# Patient Record
Sex: MALE | Race: WHITE | NOT HISPANIC OR LATINO | Employment: OTHER | URBAN - METROPOLITAN AREA
[De-identification: names, ages, dates, MRNs, and addresses within clinical notes are randomized per-mention and may not be internally consistent; named-entity substitution may affect disease eponyms.]

---

## 2017-01-04 ENCOUNTER — ANESTHESIA EVENT (OUTPATIENT)
Dept: PERIOP | Facility: HOSPITAL | Age: 57
End: 2017-01-04

## 2017-01-04 ENCOUNTER — ANESTHESIA (OUTPATIENT)
Dept: PERIOP | Facility: HOSPITAL | Age: 57
End: 2017-01-04

## 2017-01-04 PROCEDURE — 25010000002 PROPOFOL 10 MG/ML EMULSION: Performed by: NURSE ANESTHETIST, CERTIFIED REGISTERED

## 2017-01-04 PROCEDURE — 25010000002 KETOROLAC TROMETHAMINE PER 15 MG: Performed by: NURSE ANESTHETIST, CERTIFIED REGISTERED

## 2017-01-04 PROCEDURE — 25010000003 CEFAZOLIN IN DEXTROSE 2-4 GM/100ML-% SOLUTION: Performed by: ORTHOPAEDIC SURGERY

## 2017-01-04 PROCEDURE — 25010000002 PHENYLEPHRINE PER 1 ML: Performed by: NURSE ANESTHETIST, CERTIFIED REGISTERED

## 2017-01-04 PROCEDURE — 25010000002 ONDANSETRON PER 1 MG: Performed by: NURSE ANESTHETIST, CERTIFIED REGISTERED

## 2017-01-04 PROCEDURE — 25010000002 FENTANYL CITRATE (PF) 100 MCG/2ML SOLUTION: Performed by: NURSE ANESTHETIST, CERTIFIED REGISTERED

## 2017-01-04 RX ORDER — SODIUM CHLORIDE, SODIUM LACTATE, POTASSIUM CHLORIDE, CALCIUM CHLORIDE 600; 310; 30; 20 MG/100ML; MG/100ML; MG/100ML; MG/100ML
INJECTION, SOLUTION INTRAVENOUS CONTINUOUS PRN
Status: DISCONTINUED | OUTPATIENT
Start: 2017-01-04 | End: 2017-01-04 | Stop reason: SURG

## 2017-01-04 RX ORDER — PROPOFOL 10 MG/ML
VIAL (ML) INTRAVENOUS AS NEEDED
Status: DISCONTINUED | OUTPATIENT
Start: 2017-01-04 | End: 2017-01-04 | Stop reason: SURG

## 2017-01-04 RX ORDER — FENTANYL CITRATE 50 UG/ML
INJECTION, SOLUTION INTRAMUSCULAR; INTRAVENOUS AS NEEDED
Status: DISCONTINUED | OUTPATIENT
Start: 2017-01-04 | End: 2017-01-04 | Stop reason: SURG

## 2017-01-04 RX ORDER — ONDANSETRON 2 MG/ML
INJECTION INTRAMUSCULAR; INTRAVENOUS AS NEEDED
Status: DISCONTINUED | OUTPATIENT
Start: 2017-01-04 | End: 2017-01-04 | Stop reason: SURG

## 2017-01-04 RX ORDER — LIDOCAINE HYDROCHLORIDE 20 MG/ML
INJECTION, SOLUTION INFILTRATION; PERINEURAL AS NEEDED
Status: DISCONTINUED | OUTPATIENT
Start: 2017-01-04 | End: 2017-01-04 | Stop reason: SURG

## 2017-01-04 RX ORDER — KETOROLAC TROMETHAMINE 30 MG/ML
INJECTION, SOLUTION INTRAMUSCULAR; INTRAVENOUS AS NEEDED
Status: DISCONTINUED | OUTPATIENT
Start: 2017-01-04 | End: 2017-01-04 | Stop reason: SURG

## 2017-01-04 RX ADMIN — FENTANYL CITRATE 50 MCG: 50 INJECTION INTRAMUSCULAR; INTRAVENOUS at 14:34

## 2017-01-04 RX ADMIN — CEFAZOLIN SODIUM 2 G: 2 INJECTION, SOLUTION INTRAVENOUS at 14:15

## 2017-01-04 RX ADMIN — KETOROLAC TROMETHAMINE 30 MG: 30 INJECTION, SOLUTION INTRAMUSCULAR; INTRAVENOUS at 14:40

## 2017-01-04 RX ADMIN — LIDOCAINE HYDROCHLORIDE 100 MG: 20 INJECTION, SOLUTION INFILTRATION; PERINEURAL at 14:16

## 2017-01-04 RX ADMIN — PHENYLEPHRINE HYDROCHLORIDE 100 MCG: 10 INJECTION INTRAVENOUS at 14:23

## 2017-01-04 RX ADMIN — FENTANYL CITRATE 50 MCG: 50 INJECTION INTRAMUSCULAR; INTRAVENOUS at 14:16

## 2017-01-04 RX ADMIN — EPHEDRINE SULFATE 10 MG: 50 INJECTION INTRAMUSCULAR; INTRAVENOUS; SUBCUTANEOUS at 14:50

## 2017-01-04 RX ADMIN — PHENYLEPHRINE HYDROCHLORIDE 50 MCG: 10 INJECTION INTRAVENOUS at 14:47

## 2017-01-04 RX ADMIN — ONDANSETRON 4 MG: 2 INJECTION INTRAMUSCULAR; INTRAVENOUS at 14:42

## 2017-01-04 RX ADMIN — PROPOFOL 100 MG: 10 INJECTION, EMULSION INTRAVENOUS at 14:18

## 2017-01-04 RX ADMIN — PROPOFOL 200 MG: 10 INJECTION, EMULSION INTRAVENOUS at 14:16

## 2017-01-04 RX ADMIN — SODIUM CHLORIDE, POTASSIUM CHLORIDE, SODIUM LACTATE AND CALCIUM CHLORIDE: 600; 310; 30; 20 INJECTION, SOLUTION INTRAVENOUS at 14:13

## 2017-01-04 NOTE — ANESTHESIA PROCEDURE NOTES
Airway  Urgency: elective    Date/Time: 1/4/2017 2:19 PM  Airway not difficult    General Information and Staff    Patient location during procedure: OR  Anesthesiologist: RENDER, AMANDA RAY  CRNA: YONATHAN ARCHULETA    Indications and Patient Condition  Indications for airway management: airway protection    Preoxygenated: yes  MILS maintained throughout  Mask difficulty assessment: 1 - vent by mask    Final Airway Details  Final airway type: supraglottic airway      Successful airway: classic  Size 5    Number of attempts at approach: 1    Additional Comments  Patient in OR. Monitors on. BLVS. Pre 02 100%. SIVI. LMA placed with ease. No leak present. BBS and ETCO2 present. Secured. Teeth/lips as preop

## 2017-01-04 NOTE — ANESTHESIA POSTPROCEDURE EVALUATION
Patient: Silas Kwon    Procedure Summary     Date Anesthesia Start Anesthesia Stop Room / Location    01/04/17 1413 1502  [facility] /  [facility]       Procedure Diagnosis Surgeon Provider    LT KNEE ARTHROSCOPY WITH PARTIAL MEDIAL AND LATERAL MENISECTOMY, CHONDROPLASTY (Left Knee) No diagnosis on file. MD Hector Bernstein MD          Anesthesia Type: general  Last vitals  /79 (01/04/17 1530)    Temp 36.6 °C (97.8 °F) (01/04/17 1501)    Pulse 87 (01/04/17 1530)   Resp 16 (01/04/17 1530)    SpO2 97 % (01/04/17 1530)      Post Anesthesia Care and Evaluation    Patient location during evaluation: bedside  Patient participation: complete - patient participated  Level of consciousness: awake and alert  Pain management: adequate  Airway patency: patent  Anesthetic complications: no  Cardiovascular status: acceptable  Respiratory status: acceptable  Hydration status: acceptable

## 2017-01-04 NOTE — ANESTHESIA PREPROCEDURE EVALUATION
Anesthesia Evaluation      Airway   Mallampati: II  TM distance: >3 FB  Neck ROM: full  no difficulty expected  Dental - normal exam     Pulmonary - negative pulmonary ROS and normal exam   (-) wheezes  Cardiovascular - negative cardio ROS  (-) murmur    Rhythm: regular    Neuro/Psych- negative ROS  GI/Hepatic/Renal/Endo - negative ROS     Musculoskeletal (-) negative ROS    Abdominal    Substance History - negative use     OB/GYN          Other                           Anesthesia Plan    ASA 2     general   (  D/W R&B of GA including but not limited to: heart, lung, liver, kidney, neurologic problems, positioning injuries, dental damage, corneal abrasion and TMJ.  .)  intravenous induction

## 2019-07-29 ENCOUNTER — OFFICE VISIT (OUTPATIENT)
Dept: FAMILY MEDICINE CLINIC | Facility: CLINIC | Age: 59
End: 2019-07-29

## 2019-07-29 VITALS
HEIGHT: 70 IN | TEMPERATURE: 98.5 F | HEART RATE: 81 BPM | OXYGEN SATURATION: 96 % | WEIGHT: 175.2 LBS | BODY MASS INDEX: 25.08 KG/M2 | DIASTOLIC BLOOD PRESSURE: 72 MMHG | SYSTOLIC BLOOD PRESSURE: 100 MMHG

## 2019-07-29 DIAGNOSIS — M25.511 CHRONIC RIGHT SHOULDER PAIN: ICD-10-CM

## 2019-07-29 DIAGNOSIS — G89.29 CHRONIC RIGHT SHOULDER PAIN: ICD-10-CM

## 2019-07-29 DIAGNOSIS — Z12.5 PROSTATE CANCER SCREENING: ICD-10-CM

## 2019-07-29 DIAGNOSIS — E78.2 MIXED HYPERLIPIDEMIA: ICD-10-CM

## 2019-07-29 DIAGNOSIS — Z00.00 ROUTINE HEALTH MAINTENANCE: Primary | ICD-10-CM

## 2019-07-29 DIAGNOSIS — E55.9 AVITAMINOSIS D: ICD-10-CM

## 2019-07-29 PROBLEM — J30.9 ALLERGIC RHINITIS: Status: ACTIVE | Noted: 2019-07-29

## 2019-07-29 PROBLEM — M25.562 KNEE PAIN, LEFT: Status: RESOLVED | Noted: 2019-07-29 | Resolved: 2019-07-29

## 2019-07-29 PROCEDURE — 99396 PREV VISIT EST AGE 40-64: CPT | Performed by: INTERNAL MEDICINE

## 2019-07-29 NOTE — PROGRESS NOTES
Subjective   Silas Kwon is a 58 y.o. male.     Chief Complaint   Patient presents with   • Annual Exam     establish care   • Labs Only       History of Present Illness   Patient is establishing new doctor.  Has not been seen since 11/2016.  In past was told low vitamin D and high cholesterol.  Tries to follow a healthy diet.  Has a chronic right shoulder pain that is restricting his motion slightly.  Has been present for years.  Using Relief Factor and glucosamine chondroiton and helps some but is concerned.    The following portions of the patient's history were reviewed and updated as appropriate: allergies, current medications, past family history, past medical history, past social history, past surgical history and problem list.    Past Medical History:   Diagnosis Date   • Deviated nasal septum    • Hyperlipidemia    • Knee pain, left        Past Surgical History:   Procedure Laterality Date   • AMPUTATION FINGER / THUMB Left 08/2015    Medicare amputation of digits 4 and half of digit 5 following a power saw accident occurred   • KNEE ARTHROSCOPY Left 1/4/2017    Procedure: LT KNEE ARTHROSCOPY WITH PARTIAL MEDIAL AND LATERAL MENISECTOMY, CHONDROPLASTY;  Surgeon: Abiel Watson MD;  Location: [facility] OR [facility] [place];  Service:    • KNEE SURGERY     • VASECTOMY     • WISDOM TOOTH EXTRACTION         Family History   Problem Relation Age of Onset   • Colon cancer Brother        Social History     Socioeconomic History   • Marital status: Unknown     Spouse name: Not on file   • Number of children: Not on file   • Years of education: Not on file   • Highest education level: Not on file   Tobacco Use   • Smoking status: Never Smoker   • Smokeless tobacco: Never Used   Substance and Sexual Activity   • Alcohol use: No     Comment: Occasional   • Drug use: No       Review of Systems   Constitutional: Negative for activity change, appetite change, fatigue, fever, unexpected weight gain and unexpected weight loss.    HENT: Positive for congestion and postnasal drip. Negative for tinnitus and voice change.    Eyes: Negative for visual disturbance.   Respiratory: Negative for apnea, cough, chest tightness and shortness of breath.    Cardiovascular: Negative for chest pain, palpitations and leg swelling.   Gastrointestinal: Negative for abdominal distention, abdominal pain, constipation, diarrhea, nausea, vomiting, GERD and indigestion.   Genitourinary: Negative for urinary incontinence, frequency and erectile dysfunction.   Musculoskeletal: Positive for arthralgias. Negative for back pain, gait problem, joint swelling and bursitis.   Neurological: Negative for dizziness, tremors, syncope, weakness, light-headedness, headache and memory problem.   Hematological: Does not bruise/bleed easily.   Psychiatric/Behavioral: Negative for sleep disturbance and depressed mood. The patient is not nervous/anxious.        Objective   Vitals:    07/29/19 1225   BP: 100/72   Pulse: 81   Temp: 98.5 °F (36.9 °C)   SpO2: 96%     Body mass index is 25.14 kg/m².  Physical Exam   Constitutional: He is oriented to person, place, and time. He appears well-developed and well-nourished. No distress.   HENT:   Head: Normocephalic and atraumatic.   Right Ear: Hearing, tympanic membrane, external ear and ear canal normal.   Left Ear: Hearing, tympanic membrane, external ear and ear canal normal.   Nose: Nose normal.   Mouth/Throat: Oropharynx is clear and moist.   Eyes: Conjunctivae, EOM and lids are normal. Pupils are equal, round, and reactive to light. Right eye exhibits no discharge.   Neck: Normal range of motion. Neck supple. No JVD present. No tracheal deviation present. No thyromegaly present.   Cardiovascular: Normal rate, regular rhythm, normal heart sounds and intact distal pulses.   No murmur heard.  Pulmonary/Chest: Effort normal and breath sounds normal. No respiratory distress. He has no wheezes. He has no rales.   Abdominal: Soft. Normal  appearance, normal aorta and bowel sounds are normal. He exhibits no ascites, no pulsatile midline mass and no mass. There is no tenderness.   Lymphadenopathy:     He has no cervical adenopathy.   Neurological: He is alert and oriented to person, place, and time. He has normal reflexes.   Skin: Skin is intact. No rash noted. He is not diaphoretic.   Psychiatric: He has a normal mood and affect. His behavior is normal. Judgment and thought content normal.   Nursing note and vitals reviewed.      No results found for this or any previous visit (from the past 2016 hour(s)).  Assessment/Plan   Silas was seen today for annual exam and labs only.    Diagnoses and all orders for this visit:    Routine health maintenance  -     Comprehensive Metabolic Panel  -     Lipid Panel  -     Vitamin D 25 Hydroxy    Mixed hyperlipidemia  -     Comprehensive Metabolic Panel  -     Lipid Panel    Avitaminosis D  -     Vitamin D 25 Hydroxy    Chronic right shoulder pain  -     MRI Shoulder Right Without Contrast; Future    Prostate cancer screening  -     PSA Screen    Will check the labs as ordered for screening/monitoring.  Recommend flonase or nasocort for allergies.  Discussed the right shoulder pain and will initiate a right shourlder MRI evaluation per patient request.  Continuthe glucosamine and Relief factor and vitamin D.

## 2019-07-30 LAB
25(OH)D3+25(OH)D2 SERPL-MCNC: 41.5 NG/ML (ref 30–100)
ALBUMIN SERPL-MCNC: 4.5 G/DL (ref 3.5–5.5)
ALBUMIN/GLOB SERPL: 1.9 {RATIO} (ref 1.2–2.2)
ALP SERPL-CCNC: 71 IU/L (ref 39–117)
ALT SERPL-CCNC: 17 IU/L (ref 0–44)
AST SERPL-CCNC: 17 IU/L (ref 0–40)
BILIRUB SERPL-MCNC: 0.5 MG/DL (ref 0–1.2)
BUN SERPL-MCNC: 17 MG/DL (ref 6–24)
BUN/CREAT SERPL: 19 (ref 9–20)
CALCIUM SERPL-MCNC: 9.4 MG/DL (ref 8.7–10.2)
CHLORIDE SERPL-SCNC: 104 MMOL/L (ref 96–106)
CHOLEST SERPL-MCNC: 240 MG/DL (ref 100–199)
CO2 SERPL-SCNC: 23 MMOL/L (ref 20–29)
CREAT SERPL-MCNC: 0.91 MG/DL (ref 0.76–1.27)
GLOBULIN SER CALC-MCNC: 2.4 G/DL (ref 1.5–4.5)
GLUCOSE SERPL-MCNC: 74 MG/DL (ref 65–99)
HDLC SERPL-MCNC: 62 MG/DL
LDLC SERPL CALC-MCNC: 154 MG/DL (ref 0–99)
POTASSIUM SERPL-SCNC: 4.7 MMOL/L (ref 3.5–5.2)
PROT SERPL-MCNC: 6.9 G/DL (ref 6–8.5)
PSA SERPL-MCNC: 0.6 NG/ML (ref 0–4)
SODIUM SERPL-SCNC: 141 MMOL/L (ref 134–144)
TRIGL SERPL-MCNC: 119 MG/DL (ref 0–149)
VLDLC SERPL CALC-MCNC: 24 MG/DL (ref 5–40)

## 2020-03-02 ENCOUNTER — OFFICE VISIT (OUTPATIENT)
Dept: FAMILY MEDICINE CLINIC | Facility: CLINIC | Age: 60
End: 2020-03-02

## 2020-03-02 VITALS
WEIGHT: 177 LBS | DIASTOLIC BLOOD PRESSURE: 80 MMHG | BODY MASS INDEX: 25.34 KG/M2 | HEIGHT: 70 IN | OXYGEN SATURATION: 98 % | HEART RATE: 101 BPM | SYSTOLIC BLOOD PRESSURE: 118 MMHG

## 2020-03-02 DIAGNOSIS — J01.10 ACUTE NON-RECURRENT FRONTAL SINUSITIS: Primary | ICD-10-CM

## 2020-03-02 DIAGNOSIS — J30.9 ALLERGIC RHINITIS, UNSPECIFIED SEASONALITY, UNSPECIFIED TRIGGER: ICD-10-CM

## 2020-03-02 PROCEDURE — 99213 OFFICE O/P EST LOW 20 MIN: CPT | Performed by: INTERNAL MEDICINE

## 2020-03-02 RX ORDER — LORATADINE 10 MG/1
CAPSULE, LIQUID FILLED ORAL
COMMUNITY
End: 2021-04-09

## 2020-03-02 RX ORDER — AZITHROMYCIN 250 MG/1
TABLET, FILM COATED ORAL
Qty: 6 TABLET | Refills: 0 | Status: SHIPPED | OUTPATIENT
Start: 2020-03-02 | End: 2021-04-09

## 2020-03-02 NOTE — PROGRESS NOTES
Subjective   Silas Kwon is a 59 y.o. male.     Chief Complaint   Patient presents with   • Dizziness   • Sinusitis       History of Present Illness   Complains of 1-2 days of dizziness.  Last week with headache behind the eyes with some yellow then improved.  The dizziness is worse with changing positions.    The following portions of the patient's history were reviewed and updated as appropriate: allergies, current medications, past family history, past medical history, past social history, past surgical history and problem list.    Depression Screen:  No flowsheet data found.    Past Medical History:   Diagnosis Date   • Arthritis    • Deviated nasal septum    • Hyperlipidemia    • Knee pain, left        Past Surgical History:   Procedure Laterality Date   • AMPUTATION FINGER / THUMB Left 08/2015    Medicare amputation of digits 4 and half of digit 5 following a power saw accident occurred   • KNEE ARTHROSCOPY Left 1/4/2017    Procedure: LT KNEE ARTHROSCOPY WITH PARTIAL MEDIAL AND LATERAL MENISECTOMY, CHONDROPLASTY;  Surgeon: Abiel Watson MD;  [place] [facility] [place] [facility] [place]  Service:    • KNEE SURGERY     • VASECTOMY     • WISDOM TOOTH EXTRACTION         Family History   Problem Relation Age of Onset   • Colon cancer Brother        Social History     Socioeconomic History   • Marital status:      Spouse name: Not on file   • Number of children: Not on file   • Years of education: Not on file   • Highest education level: Not on file   Tobacco Use   • Smoking status: Never Smoker   • Smokeless tobacco: Never Used   Substance and Sexual Activity   • Alcohol use: No     Comment: Occasional   • Drug use: No       Current Outpatient Medications   Medication Sig Dispense Refill   • Glucosamine-Chondroit-Vit C-Mn (GLUCOSAMINE CHONDR 500 COMPLEX) capsule Take 1 tablet by mouth Every Morning.     • ibuprofen (ADVIL) 200 MG tablet Take 200 mg by mouth Every 6 (Six) Hours As Needed for mild pain (1-3). Stopped  "12/21/16     • Loratadine (CLARITIN) 10 MG capsule Take  by mouth.     • MULTIPLE VITAMIN PO Take 1 tablet by mouth Daily.     • vitamin D (CHOLECALCIFEROL) 400 UNITS tablet Take 400 Units by mouth Every Morning. 3-4 times per week     • azithromycin (ZITHROMAX Z-JANE) 250 MG tablet Take 2 tablets the first day, then 1 tablet daily for 4 days. 6 tablet 0     No current facility-administered medications for this visit.        Review of Systems   Constitutional: Negative for activity change, appetite change, fatigue, fever, unexpected weight gain and unexpected weight loss.   HENT: Positive for congestion, rhinorrhea and sinus pressure. Negative for nosebleeds, trouble swallowing and voice change.    Eyes: Negative for visual disturbance.   Respiratory: Negative for cough, chest tightness, shortness of breath and wheezing.    Cardiovascular: Negative for chest pain, palpitations and leg swelling.   Gastrointestinal: Negative for abdominal pain, blood in stool, constipation, diarrhea, nausea, vomiting, GERD and indigestion.   Genitourinary: Negative for dysuria, frequency and hematuria.   Musculoskeletal: Positive for arthralgias. Negative for back pain and myalgias.   Skin: Negative for rash and bruise.   Neurological: Positive for dizziness. Negative for tremors, weakness, light-headedness, numbness, headache and memory problem.   Hematological: Negative for adenopathy. Does not bruise/bleed easily.   Psychiatric/Behavioral: Negative for sleep disturbance and depressed mood. The patient is not nervous/anxious.        Objective   /80 (BP Location: Right arm, Patient Position: Sitting, Cuff Size: Adult)   Pulse 101   Ht 177.8 cm (70\")   Wt 80.3 kg (177 lb)   SpO2 98%   BMI 25.40 kg/m²     Physical Exam   Constitutional: He is oriented to person, place, and time. He appears well-developed and well-nourished. No distress.   HENT:   Head: Normocephalic and atraumatic.   Right Ear: External ear normal.   Left " Ear: External ear normal.   Nose: Nose normal.   Mouth/Throat: Oropharynx is clear and moist.   Eyes: Pupils are equal, round, and reactive to light. Conjunctivae and EOM are normal.   Neck: Normal range of motion. Neck supple. No tracheal deviation present. No thyromegaly present.   Cardiovascular: Normal rate, regular rhythm, normal heart sounds and intact distal pulses. Exam reveals no gallop and no friction rub.   No murmur heard.  Pulmonary/Chest: Effort normal and breath sounds normal. No respiratory distress.   Abdominal: Soft. Bowel sounds are normal. He exhibits no mass. There is no tenderness. There is no guarding.   Musculoskeletal: Normal range of motion. He exhibits no edema.   Lymphadenopathy:     He has no cervical adenopathy.   Neurological: He is alert and oriented to person, place, and time. He displays normal reflexes. He exhibits normal muscle tone.   Skin: Skin is warm and dry. Capillary refill takes less than 2 seconds. No rash noted. He is not diaphoretic.   Psychiatric: He has a normal mood and affect. His behavior is normal. Judgment and thought content normal.   Nursing note and vitals reviewed.      No results found for this or any previous visit (from the past 2016 hour(s)).  Assessment/Plan   Silas was seen today for dizziness and sinusitis.    Diagnoses and all orders for this visit:    Acute non-recurrent frontal sinusitis  -     azithromycin (ZITHROMAX Z-JANE) 250 MG tablet; Take 2 tablets the first day, then 1 tablet daily for 4 days.    Allergic rhinitis, unspecified seasonality, unspecified trigger    Treat as noted and discussed the allergies.  Recommend continuing the claritin and using fluticasone or nasocort.

## 2020-08-06 ENCOUNTER — OFFICE VISIT (OUTPATIENT)
Dept: FAMILY MEDICINE CLINIC | Facility: CLINIC | Age: 60
End: 2020-08-06

## 2020-08-06 VITALS
SYSTOLIC BLOOD PRESSURE: 108 MMHG | DIASTOLIC BLOOD PRESSURE: 72 MMHG | WEIGHT: 176 LBS | BODY MASS INDEX: 25.2 KG/M2 | OXYGEN SATURATION: 98 % | HEIGHT: 70 IN | HEART RATE: 90 BPM | TEMPERATURE: 98.4 F

## 2020-08-06 DIAGNOSIS — R42 DIZZINESS: ICD-10-CM

## 2020-08-06 DIAGNOSIS — H91.92 HEARING LOSS OF LEFT EAR, UNSPECIFIED HEARING LOSS TYPE: ICD-10-CM

## 2020-08-06 DIAGNOSIS — R51.9 NONINTRACTABLE EPISODIC HEADACHE, UNSPECIFIED HEADACHE TYPE: Primary | ICD-10-CM

## 2020-08-06 PROCEDURE — 99214 OFFICE O/P EST MOD 30 MIN: CPT | Performed by: INTERNAL MEDICINE

## 2020-08-06 RX ORDER — CHLORAL HYDRATE 500 MG
CAPSULE ORAL
COMMUNITY

## 2020-08-06 NOTE — PROGRESS NOTES
Subjective   Silas Kwon is a 59 y.o. male.     Chief Complaint   Patient presents with   • Headache   • loss of hearing     Left   • Eye Problem       History of Present Illness   Answers for HPI/ROS submitted by the patient on 8/4/2020   What is the primary reason for your visit?: Other  Please describe your symptoms.: Loss of hearing in left ear.  Severe headache when coughing , sneezing, clearing throat.  Have you had these symptoms before?: No  How long have you been having these symptoms?: Greater than 2 weeks  Please list any medications you are currently taking for this condition.: None  Please describe any probable cause for these symptoms. : Sinus or allergy?  9 days ago came home and was eating dinner and noted lost hearing in the left ear.  No pain or drainage.  No trauma or head injury.  Has constant ringing in ears for the last 10 months progressively worsening.  Has had some dizziness intermittently in the past.  Has been having intermittent headaches in in the middle of the forehead for the last 8-9 months and has increased in the 3-4 months and worsened with coughing, sneezing, clearing throat.  Vision has been some more blurry for the last month.  Saw eye doctor last month and no problems found.  No loss of vision.    The following portions of the patient's history were reviewed and updated as appropriate: allergies, current medications, past family history, past medical history, past social history, past surgical history and problem list.    Depression Screen:  No flowsheet data found.    Past Medical History:   Diagnosis Date   • Arthritis    • Deviated nasal septum    • Hyperlipidemia    • Knee pain, left        Past Surgical History:   Procedure Laterality Date   • AMPUTATION FINGER / THUMB Left 08/2015    Medicare amputation of digits 4 and half of digit 5 following a power saw accident occurred   • KNEE ARTHROSCOPY Left 1/4/2017    Procedure: LT KNEE ARTHROSCOPY WITH PARTIAL MEDIAL  AND LATERAL MENISECTOMY, CHONDROPLASTY;  Surgeon: Abiel Watson MD;  Location: [facility] [place] [facility] [place];  Service:    • KNEE SURGERY     • LIMBAL STEM CELL TRANSPLANT      Right shoulder   • VASECTOMY     • WISDOM TOOTH EXTRACTION         Family History   Problem Relation Age of Onset   • Colon cancer Brother        Social History     Socioeconomic History   • Marital status:      Spouse name: Not on file   • Number of children: Not on file   • Years of education: Not on file   • Highest education level: Not on file   Tobacco Use   • Smoking status: Never Smoker   • Smokeless tobacco: Never Used   Substance and Sexual Activity   • Alcohol use: No     Comment: Occasional   • Drug use: No       Current Outpatient Medications   Medication Sig Dispense Refill   • Glucosamine-Chondroit-Vit C-Mn (GLUCOSAMINE CHONDR 500 COMPLEX) capsule Take 1 tablet by mouth Every Morning.     • ibuprofen (ADVIL) 200 MG tablet Take 200 mg by mouth Every 6 (Six) Hours As Needed for mild pain (1-3). Stopped 12/21/16     • Loratadine (CLARITIN) 10 MG capsule Take  by mouth.     • MULTIPLE VITAMIN PO Take 1 tablet by mouth Daily.     • Omega-3 Fatty Acids (FISH OIL) 1000 MG capsule capsule Take  by mouth Daily With Breakfast.     • VITAMIN D PO Take 4,000 Units by mouth Every Morning. 3-4 times per week     • azithromycin (ZITHROMAX Z-JANE) 250 MG tablet Take 2 tablets the first day, then 1 tablet daily for 4 days. 6 tablet 0     No current facility-administered medications for this visit.        Review of Systems   Constitutional: Negative for activity change, appetite change, fatigue, fever, unexpected weight gain and unexpected weight loss.   HENT: Positive for hearing loss. Negative for nosebleeds, rhinorrhea, trouble swallowing and voice change.    Eyes: Positive for visual disturbance.   Respiratory: Negative for cough, chest tightness, shortness of breath and wheezing.    Cardiovascular: Negative for chest pain, palpitations and leg  "swelling.   Gastrointestinal: Negative for abdominal pain, blood in stool, constipation, diarrhea, nausea, vomiting, GERD and indigestion.   Genitourinary: Negative for dysuria, frequency and hematuria.   Musculoskeletal: Negative for arthralgias, back pain and myalgias.   Skin: Negative for rash and bruise.   Neurological: Positive for headache. Negative for dizziness, tremors, weakness, light-headedness, numbness and memory problem.   Hematological: Negative for adenopathy. Does not bruise/bleed easily.   Psychiatric/Behavioral: Negative for sleep disturbance and depressed mood. The patient is not nervous/anxious.        Objective   /72 (BP Location: Left arm, Patient Position: Sitting, Cuff Size: Adult)   Pulse 90   Temp 98.4 °F (36.9 °C) (Temporal)   Ht 177.8 cm (70\")   Wt 79.8 kg (176 lb)   SpO2 98%   BMI 25.25 kg/m²     Physical Exam   Constitutional: He is oriented to person, place, and time. He appears well-developed and well-nourished. No distress.   HENT:   Head: Normocephalic and atraumatic.   Right Ear: External ear normal.   Left Ear: External ear normal.   Nose: Nose normal.   Mouth/Throat: Oropharynx is clear and moist.   Eyes: Pupils are equal, round, and reactive to light. Conjunctivae and EOM are normal.   Neck: Normal range of motion. Neck supple. No tracheal deviation present. No thyromegaly present.   Cardiovascular: Normal rate, regular rhythm, normal heart sounds and intact distal pulses. Exam reveals no gallop and no friction rub.   No murmur heard.  Pulmonary/Chest: Effort normal and breath sounds normal. No respiratory distress.   Abdominal: Soft. Bowel sounds are normal. He exhibits no mass. There is no tenderness. There is no guarding.   Musculoskeletal: Normal range of motion. He exhibits no edema.   Lymphadenopathy:     He has no cervical adenopathy.   Neurological: He is alert and oriented to person, place, and time. He displays normal reflexes. He exhibits normal muscle " tone.   Skin: Skin is warm and dry. Capillary refill takes less than 2 seconds. No rash noted. He is not diaphoretic.   Psychiatric: He has a normal mood and affect. His behavior is normal. Judgment and thought content normal.   Nursing note and vitals reviewed.      No results found for this or any previous visit (from the past 2016 hour(s)).  Assessment/Plan   Silas was seen today for headache, loss of hearing and eye problem.    Diagnoses and all orders for this visit:    Nonintractable episodic headache, unspecified headache type  -     MRI Brain With & Without Contrast; Future    Hearing loss of left ear, unspecified hearing loss type  -     MRI Brain With & Without Contrast; Future    Dizziness  -     MRI Brain With & Without Contrast; Future    Discussed with the patient I am very concerned about his headache especially since it is when he has increased pressure went from cough or bearing down.  Will obtain MRI of the brain which should also evaluate for possible aneurysm, mass, tumor etc.  Also discussed that this may be also the cause for his hearing loss and dizziness so we await the MRI result.  Other possible reasons for his dizziness and hearing loss include Ménière's disease, cholesteatoma, or other pathology.  Depending upon the MRI we will determine his further testing or follow-ups.

## 2020-08-20 DIAGNOSIS — R51.9 NONINTRACTABLE EPISODIC HEADACHE, UNSPECIFIED HEADACHE TYPE: ICD-10-CM

## 2020-08-20 DIAGNOSIS — H91.92 HEARING LOSS OF LEFT EAR, UNSPECIFIED HEARING LOSS TYPE: ICD-10-CM

## 2020-08-20 DIAGNOSIS — R42 DIZZINESS: ICD-10-CM

## 2020-09-01 ENCOUNTER — TELEPHONE (OUTPATIENT)
Dept: FAMILY MEDICINE CLINIC | Facility: CLINIC | Age: 60
End: 2020-09-01

## 2020-09-01 NOTE — TELEPHONE ENCOUNTER
Pt ask that you return his/her call. Pt went to ENT and they were not able to help. Pt wants to know what to do now.

## 2020-09-02 ENCOUNTER — OFFICE VISIT (OUTPATIENT)
Dept: FAMILY MEDICINE CLINIC | Facility: CLINIC | Age: 60
End: 2020-09-02

## 2020-09-02 DIAGNOSIS — R51.9 NONINTRACTABLE EPISODIC HEADACHE, UNSPECIFIED HEADACHE TYPE: Primary | ICD-10-CM

## 2020-09-02 DIAGNOSIS — R42 DIZZINESS: ICD-10-CM

## 2020-09-02 DIAGNOSIS — H91.92 HEARING LOSS OF LEFT EAR, UNSPECIFIED HEARING LOSS TYPE: ICD-10-CM

## 2020-09-02 PROBLEM — J01.10 ACUTE NON-RECURRENT FRONTAL SINUSITIS: Status: RESOLVED | Noted: 2020-03-02 | Resolved: 2020-09-02

## 2020-09-02 PROCEDURE — 99443 PR PHYS/QHP TELEPHONE EVALUATION 21-30 MIN: CPT | Performed by: INTERNAL MEDICINE

## 2020-09-02 NOTE — TELEPHONE ENCOUNTER
Spoke to patient about ENT , states they could not do anything for him. Patient is on the schedule for a telephone visit today at 4:45pm. Patient is states he has gained about 80% of his hearing back but sounds like he is in a tunnel , he is beginning to experience the same thing in the other ear. Patient states he is waking up to a mild headache now and not just when he coughs.

## 2020-09-02 NOTE — PROGRESS NOTES
"Subjective   Silas Kwon is a 60 y.o. male.     Chief Complaint   Patient presents with   • Headache   • dizziness   • hearing loss       History of Present Illness   You have chosen to receive care through a telephone visit. Do you consent to use a telephone visit for your medical care today? Yes  Patient went to ENT Dr Hinojosa.  Patient states \"he was clueless\"  He did not think the hearing loss, dizziness and headaches were not related.  Patient states the hearing in the left ear that was totally lost is now back to 80% of what it was but still sounds like in a tunnel.  States the hearing has been progressively improving since 8/24/2020. The headaches are all the time in mid forehead and worse with coughing.  The dizziness/fogginess seems to be still in the background and is now not when he gets out of bed.  MRI shows only probable microvascular changes.      The following portions of the patient's history were reviewed and updated as appropriate: allergies, current medications, past family history, past medical history, past social history, past surgical history and problem list.    Depression Screen:  No flowsheet data found.    Past Medical History:   Diagnosis Date   • Arthritis    • Deviated nasal septum    • Hyperlipidemia    • Knee pain, left        Past Surgical History:   Procedure Laterality Date   • AMPUTATION FINGER / THUMB Left 08/2015    Medicare amputation of digits 4 and half of digit 5 following a power saw accident occurred   • KNEE ARTHROSCOPY Left 1/4/2017    Procedure: LT KNEE ARTHROSCOPY WITH PARTIAL MEDIAL AND LATERAL MENISECTOMY, CHONDROPLASTY;  Surgeon: Abiel Watson MD;  Location: [facility] [place] [facility] [place]  Service:    • KNEE SURGERY     • LIMBAL STEM CELL TRANSPLANT      Right shoulder   • VASECTOMY     • WISDOM TOOTH EXTRACTION         Family History   Problem Relation Age of Onset   • Colon cancer Brother        Social History     Socioeconomic History   • Marital status:      " Spouse name: Not on file   • Number of children: Not on file   • Years of education: Not on file   • Highest education level: Not on file   Tobacco Use   • Smoking status: Never Smoker   • Smokeless tobacco: Never Used   Substance and Sexual Activity   • Alcohol use: No     Comment: Occasional   • Drug use: No       Current Outpatient Medications   Medication Sig Dispense Refill   • azithromycin (ZITHROMAX Z-JANE) 250 MG tablet Take 2 tablets the first day, then 1 tablet daily for 4 days. 6 tablet 0   • Glucosamine-Chondroit-Vit C-Mn (GLUCOSAMINE CHONDR 500 COMPLEX) capsule Take 1 tablet by mouth Every Morning.     • ibuprofen (ADVIL) 200 MG tablet Take 200 mg by mouth Every 6 (Six) Hours As Needed for mild pain (1-3). Stopped 12/21/16     • Loratadine (CLARITIN) 10 MG capsule Take  by mouth.     • MULTIPLE VITAMIN PO Take 1 tablet by mouth Daily.     • Omega-3 Fatty Acids (FISH OIL) 1000 MG capsule capsule Take  by mouth Daily With Breakfast.     • VITAMIN D PO Take 4,000 Units by mouth Every Morning. 3-4 times per week       No current facility-administered medications for this visit.        Review of Systems   Constitutional: Negative for activity change, appetite change, fatigue, fever, unexpected weight gain and unexpected weight loss.   HENT: Positive for hearing loss and tinnitus. Negative for nosebleeds, rhinorrhea, trouble swallowing and voice change.    Eyes: Negative for visual disturbance.   Respiratory: Negative for cough, chest tightness, shortness of breath and wheezing.    Cardiovascular: Negative for chest pain, palpitations and leg swelling.   Gastrointestinal: Negative for abdominal pain, blood in stool, constipation, diarrhea, nausea, vomiting, GERD and indigestion.   Genitourinary: Negative for dysuria, frequency and hematuria.   Musculoskeletal: Negative for arthralgias, back pain and myalgias.   Skin: Negative for rash and bruise.   Neurological: Positive for dizziness and headache. Negative  for tremors, weakness, light-headedness, numbness and memory problem.   Hematological: Negative for adenopathy. Does not bruise/bleed easily.   Psychiatric/Behavioral: Negative for sleep disturbance and depressed mood. The patient is not nervous/anxious.        Objective   There were no vitals taken for this visit.    Physical Exam   Constitutional: No distress.   Pulmonary/Chest: Effort normal.  No respiratory distress.  Neurological: He is alert.   Psychiatric: He has a normal mood and affect. His behavior is normal. Thought content is normal. He does not express abnormal judgement.       No results found for this or any previous visit (from the past 2016 hour(s)).  Assessment/Plan   Silas was seen today for headache, dizziness and hearing loss.    Diagnoses and all orders for this visit:    Nonintractable episodic headache, unspecified headache type  -     Ambulatory Referral to Neurology    Dizziness  -     Ambulatory Referral to Neurology    Hearing loss of left ear, unspecified hearing loss type    Will refer to neurology for evaluation.  Discussed the symptoms my be secondary to a viral neuritis with slow improving hearing and evolution of the headaches.  Will have neurology opinion.  Discussed at length with patient and reviewed the MRI at length with patient.      Telephone visit completed.  Time of visit in discussion and care of patient and one-on-one care not including charting time of 21 minutes.

## 2020-11-18 ENCOUNTER — OFFICE VISIT (OUTPATIENT)
Dept: NEUROLOGY | Facility: CLINIC | Age: 60
End: 2020-11-18

## 2020-11-18 VITALS
OXYGEN SATURATION: 98 % | HEART RATE: 92 BPM | WEIGHT: 180 LBS | SYSTOLIC BLOOD PRESSURE: 114 MMHG | DIASTOLIC BLOOD PRESSURE: 76 MMHG | HEIGHT: 70 IN | BODY MASS INDEX: 25.77 KG/M2

## 2020-11-18 DIAGNOSIS — R51.9 CHRONIC DAILY HEADACHE: Primary | ICD-10-CM

## 2020-11-18 DIAGNOSIS — H91.92 HEARING LOSS OF LEFT EAR, UNSPECIFIED HEARING LOSS TYPE: ICD-10-CM

## 2020-11-18 DIAGNOSIS — R42 LIGHTHEADEDNESS: ICD-10-CM

## 2020-11-18 PROCEDURE — 99244 OFF/OP CNSLTJ NEW/EST MOD 40: CPT | Performed by: PSYCHIATRY & NEUROLOGY

## 2020-11-18 RX ORDER — AMITRIPTYLINE HYDROCHLORIDE 25 MG/1
25 TABLET, FILM COATED ORAL NIGHTLY
Qty: 30 TABLET | Refills: 5 | Status: SHIPPED | OUTPATIENT
Start: 2020-11-18 | End: 2020-12-18

## 2020-11-18 NOTE — PROGRESS NOTES
"Chief Complaint   Patient presents with   • Headache   • Dizziness       Patient ID: Silas Kwon is a 60 y.o. male.    HPI: I have had the pleasure of seeing your patient today.  As you may know he is a 60-year-old gentleman with a history of dizziness and headaches.  The patient describes the dizziness as more of a lightheadedness.  This happens if he is lying flat and comes up to a standing position.  He says he had an episode of room spinning-like sensation however this was a couple of years ago.  He also says that in July of this year he awoke with hearing loss in his left ear.  He apparently has gotten back at least 80% of that.  He has seen an ENT specialist who did not find any particular issues with respect to his ear.  He also had an MRI of his brain which did show evidence for possible remote lacunar infarct in the left frontal white matter.  However no other significant issues noted.  He started having headaches back in July as well.  Initially they were experienced when coughing.  He says that he would cough and experience severe pulsating head type pressure sensation.  However for the past several weeks he has noted chronic daily type headaches.  It does worsen if he does cough.  He denies any sensitivity to light or sound with his headaches.  No nausea or vomiting.  He did have carotid Doppler studies.  No significant flow-limiting stenoses were seen.  No family history of severe headaches.  He does acknowledge a head injury with a 2 x 4 several years ago.  Otherwise no severe head injuries since then.  He still does have significant hearing loss as a mention however he says it feels as if \"water is in my left ear\".    The following portions of the patient's history were reviewed and updated as appropriate: allergies, current medications, past family history, past medical history, past social history, past surgical history and problem list.    Review of Systems   HENT: Positive for hearing " loss (july 28th 2020 left ear. ). Negative for trouble swallowing and voice change.    Eyes: Negative for photophobia, redness and visual disturbance.   Musculoskeletal: Negative for gait problem, joint swelling and neck pain.   Skin: Negative for color change, rash and wound.   Neurological: Positive for dizziness (for a year. comes and goes) and headaches. Negative for tremors, seizures, syncope, facial asymmetry, speech difficulty, weakness, light-headedness and numbness.   Hematological: Negative for adenopathy. Does not bruise/bleed easily.   Psychiatric/Behavioral: Negative for agitation, behavioral problems, confusion, decreased concentration, dysphoric mood, hallucinations, self-injury, sleep disturbance and suicidal ideas. The patient is not nervous/anxious and is not hyperactive.       I have reviewed the review of systems above performed by my medical assistant.      Vitals:    11/18/20 1514   BP: 114/76   Pulse: 92   SpO2: 98%       Neurologic Exam     Mental Status   Oriented to person, place, and time.   Registration: recalls 3 of 3 objects. Follows 3 step commands.   Attention: normal. Concentration: normal.   Speech: speech is normal   Level of consciousness: alert  Knowledge: consistent with education (No deficits found.).   Normal comprehension.     Cranial Nerves     CN II   Visual fields full to confrontation.     CN III, IV, VI   Pupils are equal, round, and reactive to light.  Extraocular motions are normal.   CN III: no CN III palsy  CN VI: no CN VI palsy  Nystagmus: none   Diplopia: none    CN V   Facial sensation intact.     CN VII   Facial expression full, symmetric.     CN VIII   CN VIII normal.     CN IX, X   CN IX normal.   CN X normal.     CN XI   CN XI normal.     CN XII   CN XII normal.     Motor Exam   Muscle bulk: normal  Right arm tone: normal  Left arm tone: normal  Right leg tone: normal  Left leg tone: normal    Strength   Right neck flexion: 5/5  Left neck flexion: 5/5  Right  neck extension: 5/5  Left neck extension: 5/5  Right deltoid: 5/5  Left deltoid: 5/5  Right biceps: 5/5  Left biceps: 5/5  Right triceps: 5/5  Left triceps: 5/5  Right wrist flexion: 5/5  Left wrist flexion: 5/5  Right wrist extension: 5/5  Left wrist extension: 5/5  Right interossei: 5/5  Left interossei: 5/5  Right abdominals: 5/5  Left abdominals: 5/5  Right iliopsoas: 5/5  Left iliopsoas: 5/5  Right quadriceps: 5/5  Left quadriceps: 5/5  Right hamstrin/5  Left hamstrin/5  Right glutei: 5/5  Left glutei: 5/5  Right anterior tibial: 5/5  Left anterior tibial: 5/5  Right posterior tibial: 5/5  Left posterior tibial: 5/5  Right peroneal: 5/5  Left peroneal: 5/5  Right gastroc: 5/5  Left gastroc: 5/5    Sensory Exam   Light touch normal.   Vibration normal.   Proprioception normal.   Pinprick normal.     Gait, Coordination, and Reflexes     Gait  Gait: normal    Coordination   Romberg: negative    Tremor   Resting tremor: absent  Intention tremor: absent    Reflexes   Right brachioradialis: 2+  Left brachioradialis: 2+  Right biceps: 2+  Left biceps: 2+  Right triceps: 2+  Left triceps: 2+  Right patellar: 2+  Left patellar: 2+  Right achilles: 2+  Left achilles: 2+  Right : 2+  Left : 2+Station is normal.       Physical Exam  Vitals signs reviewed.   Constitutional:       General: He is not in acute distress.     Appearance: He is well-developed.   HENT:      Head: Normocephalic and atraumatic.   Eyes:      Extraocular Movements: EOM normal.      Pupils: Pupils are equal, round, and reactive to light.   Neck:      Musculoskeletal: Normal range of motion.   Cardiovascular:      Rate and Rhythm: Normal rate and regular rhythm.      Heart sounds: Normal heart sounds.   Pulmonary:      Effort: Pulmonary effort is normal. No respiratory distress.      Breath sounds: Normal breath sounds.   Abdominal:      General: Bowel sounds are normal. There is no distension.      Palpations: Abdomen is soft.       Tenderness: There is no abdominal tenderness.   Musculoskeletal:         General: No deformity.   Skin:     General: Skin is warm.      Findings: No rash.   Neurological:      Mental Status: He is oriented to person, place, and time.      Coordination: Romberg Test normal.      Gait: Gait is intact.      Deep Tendon Reflexes:      Reflex Scores:       Tricep reflexes are 2+ on the right side and 2+ on the left side.       Bicep reflexes are 2+ on the right side and 2+ on the left side.       Brachioradialis reflexes are 2+ on the right side and 2+ on the left side.       Patellar reflexes are 2+ on the right side and 2+ on the left side.       Achilles reflexes are 2+ on the right side and 2+ on the left side.  Psychiatric:         Speech: Speech normal.         Judgment: Judgment normal.         Procedures    Assessment/Plan: I would like to schedule him for an MRA of his head.  Along with that we will start him on amitriptyline 25 mg at night as a headache preventative.  Abortively he is to use 2 Aleve every 8 hours as needed.  He is to call for results of the MRA.  We will see him back in 4 months or sooner if needed.       Diagnoses and all orders for this visit:    1. Chronic daily headache (Primary)  -     amitriptyline (ELAVIL) 25 MG tablet; Take 1 tablet by mouth Every Night for 30 days.  Dispense: 30 tablet; Refill: 5  -     MRI Angiogram Head Without Contrast; Future    2. Hearing loss of left ear, unspecified hearing loss type  -     MRI Angiogram Head Without Contrast; Future    3. Lightheadedness  -     MRI Angiogram Head Without Contrast; Future           Tanvir Dubois II, MD

## 2020-11-30 DIAGNOSIS — H91.92 HEARING LOSS OF LEFT EAR, UNSPECIFIED HEARING LOSS TYPE: ICD-10-CM

## 2020-11-30 DIAGNOSIS — R51.9 CHRONIC DAILY HEADACHE: ICD-10-CM

## 2020-11-30 DIAGNOSIS — R42 LIGHTHEADEDNESS: ICD-10-CM

## 2020-12-15 ENCOUNTER — TELEPHONE (OUTPATIENT)
Dept: FAMILY MEDICINE CLINIC | Facility: CLINIC | Age: 60
End: 2020-12-15

## 2020-12-15 NOTE — TELEPHONE ENCOUNTER
MRI/MRA that was done on 11/24/2020 in the chart was ordered by Dr. Tanvir Dubois and was read as normal/negative with no problems found.  Patient may need to call Dr. Dubois's office for any further steps.

## 2021-03-19 ENCOUNTER — OFFICE VISIT (OUTPATIENT)
Dept: NEUROLOGY | Facility: CLINIC | Age: 61
End: 2021-03-19

## 2021-03-19 VITALS
HEART RATE: 91 BPM | HEIGHT: 70 IN | WEIGHT: 184 LBS | SYSTOLIC BLOOD PRESSURE: 116 MMHG | DIASTOLIC BLOOD PRESSURE: 84 MMHG | OXYGEN SATURATION: 98 % | BODY MASS INDEX: 26.34 KG/M2

## 2021-03-19 DIAGNOSIS — R42 LIGHTHEADEDNESS: ICD-10-CM

## 2021-03-19 DIAGNOSIS — H91.92 HEARING LOSS OF LEFT EAR, UNSPECIFIED HEARING LOSS TYPE: ICD-10-CM

## 2021-03-19 DIAGNOSIS — R51.9 CHRONIC DAILY HEADACHE: Primary | ICD-10-CM

## 2021-03-19 PROCEDURE — 99214 OFFICE O/P EST MOD 30 MIN: CPT | Performed by: PSYCHIATRY & NEUROLOGY

## 2021-03-19 RX ORDER — ASPIRIN 81 MG/1
81 TABLET ORAL DAILY
COMMUNITY

## 2021-03-25 ENCOUNTER — TELEPHONE (OUTPATIENT)
Dept: NEUROLOGY | Facility: CLINIC | Age: 61
End: 2021-03-25

## 2021-03-25 DIAGNOSIS — H91.92 HEARING LOSS OF LEFT EAR, UNSPECIFIED HEARING LOSS TYPE: Primary | ICD-10-CM

## 2021-03-25 NOTE — TELEPHONE ENCOUNTER
I see the cardiology referral. I see where dr. Dubois said he wanted the referral to [facility] for theComprehensive Hearing Test  but I don't see it as an order? Can you see this or do I need to have him put this is again? I also called pt and explained to patient he was just seen Friday and someone will call from these office to get him scheduled. I told him if he doesn't hear anything by the end of next week to call back. Please advise on other order. Thank you.

## 2021-03-25 NOTE — TELEPHONE ENCOUNTER
Provider: DR. CAROLINA  Caller: DEBORAH HOUSTON  Relationship to Patient: SELF    Reason for Call: PT IS CALLING STATING THAT DR. CAROLINA HAD ORDERED A TEST AND REFERRAL, HE HAS NOT HEARD NOTHING FROM ANYONE FOR AN APPT. THE TEST IS Comprehensive Hearing Test AND WAS Referral to Cardiology.      PLEASE CALL HIM BACK [phone] AND LET HIM KNOW THE  STATUS IF THIS TEST/REFERRAL.

## 2021-04-09 ENCOUNTER — OFFICE VISIT (OUTPATIENT)
Dept: CARDIOLOGY | Facility: CLINIC | Age: 61
End: 2021-04-09

## 2021-04-09 VITALS
WEIGHT: 184 LBS | SYSTOLIC BLOOD PRESSURE: 120 MMHG | DIASTOLIC BLOOD PRESSURE: 80 MMHG | HEIGHT: 70 IN | HEART RATE: 93 BPM | BODY MASS INDEX: 26.34 KG/M2

## 2021-04-09 DIAGNOSIS — R42 DIZZINESS ON STANDING: Primary | ICD-10-CM

## 2021-04-09 PROCEDURE — 99203 OFFICE O/P NEW LOW 30 MIN: CPT | Performed by: INTERNAL MEDICINE

## 2021-04-09 PROCEDURE — 93000 ELECTROCARDIOGRAM COMPLETE: CPT | Performed by: INTERNAL MEDICINE

## 2021-04-09 RX ORDER — LORATADINE 10 MG/1
10 TABLET ORAL AS NEEDED
COMMUNITY

## 2021-04-09 NOTE — PROGRESS NOTES
Subjective:     Encounter Date:04/09/21      Patient ID: Silas Kwon is a 60 y.o. male.    Chief Complaint:  History of Present Illness    Dear Dr. Dubois,    I had the pleasure of seeing this patient in the office today for initial evaluation and consultation.  I appreciate that you sent him in to see us.  They come in today to be seen for.    Patient states that for many years he has noted that if he is lying on the floor and then he stands up quickly he gets lightheaded.  He will feel lightheaded for 5 seconds or so and then it is normal.  If he sitting down and stands up he does not have this.  If he is lying in his bed, sits up on the edge and stands up he does not have it.  It only occurs when he is on the ground and then pops up.  He does have to do this on a regular basis because he have his cats and he sometimes has to get them from underneath a bed to take him outside.  He is never passed out nor is he ever come close to doing so.    His main issue has been left sided hearing loss.  He says he woke up 1 morning this occurred instantly completely.  He has gradually been getting recovery of his hearing but he is having bad tinnitus.  He also gets a severe headache every time he coughs.  I know that he has been working with you on this.  He seen ENT, and has had 2 separate MRIs that did not show any overt abnormality.    This patient has no known cardiac history.  This patient has no history of coronary artery disease, congestive heart failure, rheumatic fever, rheumatic heart disease, congenital heart disease or heart murmur.  This patient has never required invasive cardiovascular evaluation.    He denies any feeling of palpitations or tachycardia, no presyncope or syncope.  No lower extremity edema.  No chills or fevers.    The following portions of the patient's history were reviewed and updated as appropriate: allergies, current medications, past family history, past medical history, past  "social history, past surgical history and problem list.      ECG 12 Lead    Date/Time: 4/9/2021 11:20 AM  Performed by: Denny Lopez III, MD  Authorized by: Denny Lopez III, MD   Comparison: compared with previous ECG   Similar to previous ECG  Rhythm: sinus rhythm  Rate: normal  Conduction: conduction normal  ST Segments: ST segments normal  T Waves: T waves normal  QRS axis: normal  Other: no other findings    Clinical impression: normal ECG               Objective:     Vitals:    04/09/21 0937   BP: 120/80   Pulse: 93   Weight: 83.5 kg (184 lb)   Height: 177.8 cm (70\")     Body mass index is 26.4 kg/m².      Vitals reviewed.   Constitutional:       General: Not in acute distress.     Appearance: Well-developed. Not diaphoretic.   Eyes:      General:         Right eye: No discharge.         Left eye: No discharge.      Conjunctiva/sclera: Conjunctivae normal.      Pupils: Pupils are equal, round, and reactive to light.   HENT:      Head: Normocephalic and atraumatic.      Nose: Nose normal.   Neck:      Thyroid: No thyromegaly.      Trachea: No tracheal deviation.      Lymphadenopathy: No cervical adenopathy.   Pulmonary:      Effort: Pulmonary effort is normal. No respiratory distress.      Breath sounds: Normal breath sounds. No stridor.   Chest:      Chest wall: Not tender to palpatation.   Cardiovascular:      Normal rate. Regular rhythm.      Murmurs: There is no murmur.      . No S3 gallop. No click. No rub.   Pulses:     Intact distal pulses.   Abdominal:      General: Bowel sounds are normal. There is no distension.      Palpations: Abdomen is soft. There is no abdominal mass.      Tenderness: There is no abdominal tenderness. There is no guarding or rebound.   Musculoskeletal: Normal range of motion.         General: No tenderness or deformity.      Cervical back: Normal range of motion and neck supple. Skin:     General: Skin is warm and dry.      Findings: No erythema or rash.   Neurological:      " Mental Status: Alert and oriented to person, place, and time.      Deep Tendon Reflexes: Reflexes are normal and symmetric.   Psychiatric:         Thought Content: Thought content normal.         Data and records reviewed:     Lab Results   Component Value Date    GLUCOSE 99 12/28/2016    BUN 17 07/29/2019    CREATININE 0.91 07/29/2019    EGFRIFNONA 92 07/29/2019    EGFRIFAFRI 106 07/29/2019    BCR 19 07/29/2019    K 4.7 07/29/2019    CO2 23 07/29/2019    CALCIUM 9.4 07/29/2019    PROTENTOTREF 6.9 07/29/2019    ALBUMIN 4.5 07/29/2019    LABIL2 1.9 07/29/2019    AST 17 07/29/2019    ALT 17 07/29/2019     No results found for: CHOL  Lab Results   Component Value Date    TRIG 119 07/29/2019    TRIG 164 (H) 06/16/2016    TRIG 219 (H) 01/07/2015     Lab Results   Component Value Date    HDL 62 07/29/2019    HDL 56 06/16/2016    HDL 62 (H) 01/07/2015     Lab Results   Component Value Date     (H) 07/29/2019     (H) 06/16/2016     (H) 01/07/2015     Lab Results   Component Value Date    VLDL 24 07/29/2019    VLDL 32.8 06/16/2016    VLDL 44 01/07/2015     No results found for: LDLHDL    No radiology results for the last 90 days.          Assessment:          Diagnosis Plan   1. Dizziness on standing            Plan:       This patient has dizziness on standing consistent with some orthostatic tendency.  He only has it if he is laying down and jumps up fast.  He otherwise does not have any other time.  He states that he has good fluid intake in the morning but then throughout the afternoon when he is working generally does not drink anything.  He states that ever since he had some head trauma that he makes in PE rapidly when he drinks.  We did recommend that he go from a lying to a standing position slightly more gradually, and also work to ensure that he stays hydrated through the afternoon.  This point it does not sound like any further diagnostic testing is necessary, nor does it sound like  pharmacologic therapy is indicated.  I did ask him to call me if he starts having more symptoms.  He will continue to follow-up with ENT and neurology for the ongoing issues with tinnitus, hearing loss, and severe headache when he coughs.  Thank you very much for allowing us to participate in the care of this pleasant patient.  Please don't hesitate to call if I can be of assistance in any way.      Current Outpatient Medications:   •  aspirin 81 MG EC tablet, Take 81 mg by mouth Daily., Disp: , Rfl:   •  Glucosamine-Chondroit-Vit C-Mn (GLUCOSAMINE CHONDR 500 COMPLEX) capsule, Take 1 tablet by mouth Every Morning., Disp: , Rfl:   •  ibuprofen (ADVIL) 200 MG tablet, Take 200 mg by mouth Every 6 (Six) Hours As Needed for mild pain (1-3). Stopped 12/21/16, Disp: , Rfl:   •  loratadine (CLARITIN) 10 MG tablet, Take 10 mg by mouth As Needed for Allergies., Disp: , Rfl:   •  MULTIPLE VITAMIN PO, Take 1 tablet by mouth Daily., Disp: , Rfl:   •  NON FORMULARY, Take 1 tablet by mouth Daily. Relief factor, Disp: , Rfl:   •  Omega-3 Fatty Acids (FISH OIL) 1000 MG capsule capsule, Take  by mouth Daily With Breakfast., Disp: , Rfl:   •  azithromycin (ZITHROMAX Z-JANE) 250 MG tablet, Take 2 tablets the first day, then 1 tablet daily for 4 days., Disp: 6 tablet, Rfl: 0  •  Loratadine (CLARITIN) 10 MG capsule, Take  by mouth., Disp: , Rfl:   •  VITAMIN D PO, Take 4,000 Units by mouth Every Morning. 3-4 times per week, Disp: , Rfl:          No follow-ups on file.

## 2021-04-19 NOTE — PROGRESS NOTES
Chief Complaint   Patient presents with   • Headache   • Dizziness       Patient ID: Silas Kwon is a 60 y.o. male.    HPI:  I have had the pleasure of seeing your patient today.  As you may know he is a 60-year-old gentleman with a history of tinnitus, hearing loss and headache.  He says that he is doing approximately the same.  We did schedule him for an MRA of the head which was within normal limits.  He says that he still has the tinnitus as well as the hearing loss in the left.  Again he has seen an ENT specialist who did not see any particular issue.  He says that his headache tends to be worse if he lies down.  He also has the dizziness which he describes as a lightheaded-like sensation when he changes positions.  He has no abrupt onset focal weakness or numbness.  No loss of vision or double vision.    The following portions of the patient's history were reviewed and updated as appropriate: allergies, current medications, past family history, past medical history, past social history, past surgical history and problem list.    Review of Systems   Musculoskeletal: Negative for back pain, gait problem and neck pain.   Neurological: Positive for dizziness and headaches (pt states he still has the mild headaches. ). Negative for tremors, seizures, syncope, facial asymmetry, speech difficulty, weakness, light-headedness and numbness.   Hematological: Negative for adenopathy. Does not bruise/bleed easily.   Psychiatric/Behavioral: Negative for agitation, behavioral problems, confusion, decreased concentration, dysphoric mood, hallucinations, self-injury, sleep disturbance and suicidal ideas. The patient is not nervous/anxious and is not hyperactive.       I have reviewed the review of systems above performed by my medical assistant.      Vitals:    03/19/21 1516   BP: 116/84   Pulse: 91   SpO2: 98%       Neurologic Exam     Mental Status   Oriented to person, place, and time.   Concentration: normal.    Level of consciousness: alert  Knowledge: consistent with education (No deficits found.).     Cranial Nerves     CN II   Visual fields full to confrontation.     CN III, IV, VI   Pupils are equal, round, and reactive to light.  Extraocular motions are normal.   CN III: no CN III palsy  CN VI: no CN VI palsy    CN V   Facial sensation intact.     CN VII   Facial expression full, symmetric.     CN VIII   CN VIII normal.     CN IX, X   CN IX normal.   CN X normal.     CN XI   CN XI normal.     CN XII   CN XII normal.     Motor Exam     Strength   Right neck flexion: 5/5  Left neck flexion: 5/5  Right neck extension: 5/5  Left neck extension: 5/5  Right deltoid: 5/5  Left deltoid: 5/5  Right biceps: 5/5  Left biceps: 5/5  Right triceps: 5/5  Left triceps: 5/5  Right wrist flexion: 5/5  Left wrist flexion: 5/5  Right wrist extension: 5/5  Left wrist extension: 5/5  Right interossei: 5/5  Left interossei: 5/5  Right abdominals: 5/5  Left abdominals: 5/5  Right iliopsoas: 5/5  Left iliopsoas: 5/5  Right quadriceps: 5/5  Left quadriceps: 5/5  Right hamstrin/5  Left hamstrin/5  Right glutei: 5/5  Left glutei: 5/5  Right anterior tibial: 5/5  Left anterior tibial: 5/5  Right posterior tibial: 5/5  Left posterior tibial: 5/5  Right peroneal: 5/5  Left peroneal: 5/5  Right gastroc: 5/5  Left gastroc: 5/5    Sensory Exam   Light touch normal.   Vibration normal.     Gait, Coordination, and Reflexes     Gait  Gait: normal    Reflexes   Right brachioradialis: 2+  Left brachioradialis: 2+  Right biceps: 2+  Left biceps: 2+  Right triceps: 2+  Left triceps: 2+  Right patellar: 2+  Left patellar: 2+  Right achilles: 2+  Left achilles: 2+  Right : 2+  Left : 2+Station is normal.       Physical Exam  Vitals reviewed.   Constitutional:       Appearance: He is well-developed.   HENT:      Head: Normocephalic and atraumatic.   Eyes:      Extraocular Movements: EOM normal.      Pupils: Pupils are equal, round, and  reactive to light.   Cardiovascular:      Rate and Rhythm: Normal rate and regular rhythm.   Pulmonary:      Breath sounds: Normal breath sounds.   Musculoskeletal:         General: Normal range of motion.   Skin:     General: Skin is warm.   Neurological:      Mental Status: He is oriented to person, place, and time.      Gait: Gait is intact.      Deep Tendon Reflexes:      Reflex Scores:       Tricep reflexes are 2+ on the right side and 2+ on the left side.       Bicep reflexes are 2+ on the right side and 2+ on the left side.       Brachioradialis reflexes are 2+ on the right side and 2+ on the left side.       Patellar reflexes are 2+ on the right side and 2+ on the left side.       Achilles reflexes are 2+ on the right side and 2+ on the left side.        Procedures    Assessment/Plan: At this point we will have him scheduled for a tilt table test with cardiology.  Also referral to the [facility] [place] for the issues with tinnitus and hearing loss.  We will see him back after testing.       Diagnoses and all orders for this visit:    1. Chronic daily headache (Primary)    2. Hearing loss of left ear, unspecified hearing loss type  -     Comprehensive Hearing Test; Future    3. Lightheadedness  -     Ambulatory Referral to Cardiology           Tanvir Dubois II, MD           36.5

## 2021-04-20 VITALS
DIASTOLIC BLOOD PRESSURE: 69 MMHG | HEART RATE: 79 BPM | DIASTOLIC BLOOD PRESSURE: 74 MMHG | OXYGEN SATURATION: 91 % | SYSTOLIC BLOOD PRESSURE: 87 MMHG | RESPIRATION RATE: 22 BRPM | SYSTOLIC BLOOD PRESSURE: 100 MMHG | DIASTOLIC BLOOD PRESSURE: 73 MMHG | RESPIRATION RATE: 23 BRPM | OXYGEN SATURATION: 94 % | DIASTOLIC BLOOD PRESSURE: 62 MMHG | WEIGHT: 175 LBS | SYSTOLIC BLOOD PRESSURE: 111 MMHG | HEART RATE: 100 BPM | RESPIRATION RATE: 21 BRPM | HEART RATE: 72 BPM | DIASTOLIC BLOOD PRESSURE: 71 MMHG | DIASTOLIC BLOOD PRESSURE: 59 MMHG | SYSTOLIC BLOOD PRESSURE: 95 MMHG | DIASTOLIC BLOOD PRESSURE: 56 MMHG | DIASTOLIC BLOOD PRESSURE: 60 MMHG | HEIGHT: 70 IN | OXYGEN SATURATION: 95 % | SYSTOLIC BLOOD PRESSURE: 96 MMHG | DIASTOLIC BLOOD PRESSURE: 65 MMHG | HEART RATE: 87 BPM | OXYGEN SATURATION: 97 % | HEART RATE: 90 BPM | SYSTOLIC BLOOD PRESSURE: 101 MMHG | HEART RATE: 85 BPM | SYSTOLIC BLOOD PRESSURE: 98 MMHG | HEART RATE: 91 BPM | RESPIRATION RATE: 20 BRPM | DIASTOLIC BLOOD PRESSURE: 79 MMHG | SYSTOLIC BLOOD PRESSURE: 90 MMHG | SYSTOLIC BLOOD PRESSURE: 110 MMHG | TEMPERATURE: 97.2 F | RESPIRATION RATE: 9 BRPM | OXYGEN SATURATION: 93 % | SYSTOLIC BLOOD PRESSURE: 107 MMHG | RESPIRATION RATE: 24 BRPM | TEMPERATURE: 97.1 F | RESPIRATION RATE: 16 BRPM | HEART RATE: 93 BPM

## 2021-04-27 ENCOUNTER — AMBULATORY SURGICAL CENTER (AMBULATORY)
Dept: URBAN - METROPOLITAN AREA SURGERY 17 | Facility: SURGERY | Age: 61
End: 2021-04-27

## 2021-04-27 ENCOUNTER — OFFICE (AMBULATORY)
Dept: URBAN - METROPOLITAN AREA PATHOLOGY 4 | Facility: PATHOLOGY | Age: 61
End: 2021-04-27

## 2021-04-27 DIAGNOSIS — K57.30 DIVERTICULOSIS OF LARGE INTESTINE WITHOUT PERFORATION OR ABS: ICD-10-CM

## 2021-04-27 DIAGNOSIS — Z12.11 ENCOUNTER FOR SCREENING FOR MALIGNANT NEOPLASM OF COLON: ICD-10-CM

## 2021-04-27 DIAGNOSIS — D12.3 BENIGN NEOPLASM OF TRANSVERSE COLON: ICD-10-CM

## 2021-04-27 PROBLEM — K63.5 POLYP OF COLON: Status: ACTIVE | Noted: 2021-04-27

## 2021-04-27 LAB
GI HISTOLOGY: A. UNSPECIFIED: (no result)
GI HISTOLOGY: PDF REPORT: (no result)

## 2021-04-27 PROCEDURE — 88305 TISSUE EXAM BY PATHOLOGIST: CPT | Mod: 33 | Performed by: INTERNAL MEDICINE

## 2021-04-27 PROCEDURE — 45385 COLONOSCOPY W/LESION REMOVAL: CPT | Mod: 33 | Performed by: INTERNAL MEDICINE

## 2021-07-22 ENCOUNTER — TELEPHONE (OUTPATIENT)
Dept: NEUROLOGY | Facility: CLINIC | Age: 61
End: 2021-07-22

## 2021-07-22 NOTE — TELEPHONE ENCOUNTER
Attempted to call patient to r/s due to provider family emergency. No answer. LM for patient to call back to r/s.

## 2022-12-05 ENCOUNTER — TELEPHONE (OUTPATIENT)
Dept: FAMILY MEDICINE CLINIC | Facility: CLINIC | Age: 62
End: 2022-12-05

## 2022-12-05 NOTE — TELEPHONE ENCOUNTER
PATIENT CALLED REQUESTING SINGULAR FOR HIS DRAINAGE. HE WOULD LIKE TO TRY A SAMPLE IF POSSIBLE. THE CLARITIN HAS STOPPED WORKING. HIS SISTER WHO IS A DOCTOR AND SHE RECOMMENDS HE GIVE THIS A TRY.    PLEASE CALL  [phone]    [id number] - [place], [place] - [address] - [phone]  - [phone]   [phone]

## 2022-12-05 NOTE — TELEPHONE ENCOUNTER
Patient informed he hasn't been seen in two years and would require an appointment, scheduled for 01/16/23

## 2023-01-16 ENCOUNTER — OFFICE VISIT (OUTPATIENT)
Dept: FAMILY MEDICINE CLINIC | Facility: CLINIC | Age: 63
End: 2023-01-16

## 2023-01-16 VITALS
SYSTOLIC BLOOD PRESSURE: 118 MMHG | HEART RATE: 88 BPM | DIASTOLIC BLOOD PRESSURE: 84 MMHG | BODY MASS INDEX: 26.28 KG/M2 | HEIGHT: 70 IN | WEIGHT: 183.6 LBS | OXYGEN SATURATION: 95 %

## 2023-01-16 DIAGNOSIS — Z00.00 ENCOUNTER FOR ANNUAL PHYSICAL EXAM: Primary | ICD-10-CM

## 2023-01-16 DIAGNOSIS — G47.9 SLEEP DISTURBANCES: ICD-10-CM

## 2023-01-16 DIAGNOSIS — Z28.21 HERPES ZOSTER VACCINATION DECLINED: ICD-10-CM

## 2023-01-16 DIAGNOSIS — Z28.21 INFLUENZA VACCINATION DECLINED: ICD-10-CM

## 2023-01-16 DIAGNOSIS — E78.2 MIXED HYPERLIPIDEMIA: ICD-10-CM

## 2023-01-16 DIAGNOSIS — J30.9 ALLERGIC RHINITIS, UNSPECIFIED SEASONALITY, UNSPECIFIED TRIGGER: ICD-10-CM

## 2023-01-16 DIAGNOSIS — Z12.5 SPECIAL SCREENING, PROSTATE CANCER: ICD-10-CM

## 2023-01-16 DIAGNOSIS — Z28.21 COVID-19 VACCINATION DECLINED: ICD-10-CM

## 2023-01-16 DIAGNOSIS — E55.9 VITAMIN D DEFICIENCY: ICD-10-CM

## 2023-01-16 DIAGNOSIS — F51.01 PRIMARY INSOMNIA: ICD-10-CM

## 2023-01-16 PROCEDURE — 99396 PREV VISIT EST AGE 40-64: CPT | Performed by: INTERNAL MEDICINE

## 2023-01-16 RX ORDER — MONTELUKAST SODIUM 10 MG/1
10 TABLET ORAL NIGHTLY
Qty: 30 TABLET | Refills: 11 | Status: SHIPPED | OUTPATIENT
Start: 2023-01-16

## 2023-01-16 RX ORDER — DOXEPIN HYDROCHLORIDE 10 MG/1
10 CAPSULE ORAL NIGHTLY PRN
Qty: 30 CAPSULE | Refills: 1 | Status: SHIPPED | OUTPATIENT
Start: 2023-01-16

## 2023-01-16 NOTE — PROGRESS NOTES
Chief Complaint   Patient presents with   • Annual Exam     Would to see if he could be put on singular and if he could get a sleep aid.       HPI:  Silas Kwon, -1960, is a 62 y.o. male who presents for an annual physical.    Follow-up for mixed hyperlipidemia.  Currently, has been feeling well without any myalgias, muscle aches, weakness, numbness, chest pain, short of breath or other issues.  Currently, is on no medications.    Years of allergy issues with rhinorhea.  Using claritin with no resolution.      Patient has difficulty sleeping for years ever since a child and has to stay up late till tired and able to fall asleep quickly but awakens after 1-3 hours then awake and unable to fall back asleep.  Does feel rested.     Recent Hospitalizations:  No hospitalization(s) within the last year..    Current Medical Providers:  Patient Care Team:  Eduar Mccray MD as PCP - General (Internal Medicine)  Tanvir Dubois II, MD as Consulting Physician (Neurology)    Compared to one year ago, the patient feels his physical health is the same and his mental health is the same.    Depression Screen:  PHQ-2/PHQ-9 Depression Screening 2023   Little Interest or Pleasure in Doing Things 0-->not at all   Feeling Down, Depressed or Hopeless 0-->not at all   PHQ-9: Brief Depression Severity Measure Score 0     Past Medical/Family/Social History:  The following portions of the patient's history were reviewed and updated as appropriate: allergies, current medications, past family history, past medical history, past social history, past surgical history and problem list.    No Known Allergies    Current Outpatient Medications:   •  aspirin 81 MG EC tablet, Take 81 mg by mouth Daily., Disp: , Rfl:   •  Glucosamine-Chondroit-Vit C-Mn (GLUCOSAMINE CHONDR 500 COMPLEX) capsule, Take 1 tablet by mouth Every Morning., Disp: , Rfl:   •  loratadine (CLARITIN) 10 MG tablet, Take 10 mg by mouth As Needed for  Allergies., Disp: , Rfl:   •  MULTIPLE VITAMIN PO, Take 1 tablet by mouth Daily., Disp: , Rfl:   •  NON FORMULARY, Take 1 tablet by mouth Daily. Relief factor, Disp: , Rfl:   •  Omega-3 Fatty Acids (FISH OIL) 1000 MG capsule capsule, Take  by mouth Daily With Breakfast., Disp: , Rfl:   •  doxepin (SINEquan) 10 MG capsule, Take 1 capsule by mouth At Night As Needed for Sleep., Disp: 30 capsule, Rfl: 1  •  ibuprofen (ADVIL,MOTRIN) 200 MG tablet, Take 200 mg by mouth Every 6 (Six) Hours As Needed for mild pain (1-3). Stopped 12/21/16, Disp: , Rfl:   •  montelukast (Singulair) 10 MG tablet, Take 1 tablet by mouth Every Night., Disp: 30 tablet, Rfl: 11    Current medication list contains no high risk medications.  No harmful drug interactions have been identified.     Family History   Problem Relation Age of Onset   • Colon cancer Brother    • Heart attack Mother 80   • Heart disease Mother      Social History     Tobacco Use   • Smoking status: Never   • Smokeless tobacco: Never   Substance Use Topics   • Alcohol use: Yes     Comment: Occasional     Past Surgical History:   Procedure Laterality Date   • AMPUTATION FINGER / THUMB Left 08/2015    Medicare amputation of digits 4 and half of digit 5 following a power saw accident occurred   • KNEE ARTHROSCOPY Left 1/4/2017    Procedure: LT KNEE ARTHROSCOPY WITH PARTIAL MEDIAL AND LATERAL MENISECTOMY, CHONDROPLASTY;  Surgeon: Abiel Watson MD;  Location: [facility] [place];  Service:    • KNEE SURGERY     • LIMBAL STEM CELL TRANSPLANT      Right shoulder   • VASECTOMY     • WISDOM TOOTH EXTRACTION       Patient Active Problem List   Diagnosis   • HLD (hyperlipidemia)   • Fungal infection of nail   • Avitaminosis D   • Abnormal bone development   • Routine health maintenance   • Chronic right shoulder pain   • Allergic rhinitis   • Episodic headache   • Hearing loss, left   • Vertigo   • Dizziness on standing   • Encounter for annual physical exam   • Special screening,  "prostate cancer   • Primary insomnia   • Influenza vaccination declined   • COVID-19 vaccination declined   • Herpes zoster vaccination declined     Review of Systems   Constitutional: Negative for activity change, appetite change, fatigue, fever, unexpected weight gain and unexpected weight loss.   HENT: Positive for rhinorrhea. Negative for nosebleeds, trouble swallowing and voice change.    Eyes: Negative for visual disturbance.   Respiratory: Negative for cough, chest tightness, shortness of breath and wheezing.    Cardiovascular: Negative for chest pain, palpitations and leg swelling.   Gastrointestinal: Negative for abdominal pain, blood in stool, constipation, diarrhea, nausea, vomiting, GERD and indigestion.   Genitourinary: Negative for dysuria, frequency and hematuria.   Musculoskeletal: Negative for arthralgias, back pain and myalgias.   Skin: Negative for rash and wound.   Neurological: Negative for dizziness, tremors, weakness, light-headedness, numbness, headache and memory problem.   Hematological: Negative for adenopathy. Does not bruise/bleed easily.   Psychiatric/Behavioral: Positive for sleep disturbance. Negative for agitation, behavioral problems, hallucinations, suicidal ideas, depressed mood and stress. The patient is not nervous/anxious.        Objective     Vitals:    01/16/23 1400   BP: 118/84   BP Location: Left arm   Patient Position: Sitting   Cuff Size: Adult   Pulse: 88   SpO2: 95%   Weight: 83.3 kg (183 lb 9.6 oz)   Height: 177.8 cm (70\")     BMI is >= 25 and <30. (Overweight) The following options were offered after discussion;: weight loss educational material (shared in after visit summary), exercise counseling/recommendations and nutrition counseling/recommendations    Physical Exam  Vitals and nursing note reviewed.   Constitutional:       General: He is not in acute distress.     Appearance: He is well-developed. He is not diaphoretic.   HENT:      Head: Normocephalic and " atraumatic.      Right Ear: External ear normal.      Left Ear: External ear normal.      Nose: Nose normal.   Eyes:      Conjunctiva/sclera: Conjunctivae normal.      Pupils: Pupils are equal, round, and reactive to light.   Neck:      Thyroid: No thyromegaly.      Trachea: No tracheal deviation.   Cardiovascular:      Rate and Rhythm: Normal rate and regular rhythm.      Heart sounds: Normal heart sounds. No murmur heard.    No friction rub. No gallop.   Pulmonary:      Effort: Pulmonary effort is normal. No respiratory distress.      Breath sounds: Normal breath sounds.   Abdominal:      General: Bowel sounds are normal.      Palpations: Abdomen is soft. There is no mass.      Tenderness: There is no abdominal tenderness. There is no guarding.   Musculoskeletal:         General: Normal range of motion.      Cervical back: Normal range of motion and neck supple.   Lymphadenopathy:      Cervical: No cervical adenopathy.   Skin:     General: Skin is warm and dry.      Capillary Refill: Capillary refill takes less than 2 seconds.      Findings: No rash.   Neurological:      Mental Status: He is alert and oriented to person, place, and time.      Motor: No abnormal muscle tone.      Deep Tendon Reflexes: Reflexes normal.   Psychiatric:         Behavior: Behavior normal.         Thought Content: Thought content normal.         Judgment: Judgment normal.     Recent Lab Results:     Lab Results   Component Value Date    TRIG 119 07/29/2019    HDL 62 07/29/2019    VLDL 24 07/29/2019     Assessment & Plan   Age-appropriate Screening Schedule:  Refer to the list below for future screening recommendations based on patient's age, sex and/or medical conditions.      Health Maintenance   Topic Date Due   • LIPID PANEL  07/29/2020   • ZOSTER VACCINE (1 of 2) 01/16/2023 (Originally 8/23/2009)   • INFLUENZA VACCINE  03/31/2023 (Originally 8/1/2022)   • TDAP/TD VACCINES (2 - Td or Tdap) 01/17/2025     Diagnoses and all orders for  this visit:    1. Encounter for annual physical exam (Primary)    2. Mixed hyperlipidemia  -     Lipid Panel    3. Special screening, prostate cancer  -     PSA Screen    4. Allergic rhinitis, unspecified seasonality, unspecified trigger  -     montelukast (Singulair) 10 MG tablet; Take 1 tablet by mouth Every Night.  Dispense: 30 tablet; Refill: 11    5. Primary insomnia  -     doxepin (SINEquan) 10 MG capsule; Take 1 capsule by mouth At Night As Needed for Sleep.  Dispense: 30 capsule; Refill: 1  -     Ambulatory Referral to Sleep Medicine    6. Sleep disturbances  -     Ambulatory Referral to Sleep Medicine    7. Herpes zoster vaccination declined    8. COVID-19 vaccination declined    9. Influenza vaccination declined    10. Vitamin D deficiency  -     Vitamin D,25-Hydroxy    Annual wellness visit reviewed with patient.  All past history, medications, social history, and problem list were reviewed.  Discussed advanced directives and living will.  Patient has living will: Living will: yes and patient will bring copy to office.  Will check the labs as ordered above to evaluate the blood sugars, kidney, liver, cholesterol for screening.  Discussed flu shot recommended to get the influenza vaccine annually in the fall.  Influenza, prevnar 20, Shingrix and covid vaccination series discussed.  Encouraged follow-up with the eye doctor on annual basis.  Discussed weight and encouraged exercise as tolerated while following a healthy diet.  Reviewed sexual health and safe sex practices.  Colon cancer screening discussed and current status: colonoscopy completed 4/27/21.  Discussed prostate screening.  Follow up with current specialists as needed.     Addition of Singulair for allergy control along with his Claritin discussed and will start.  Return if he has persisting problems.  Discussed the sleep maintenence chronic issues and will avoid benzodiazepine group of medications and will try doxepin 10 mg PRN at night for  sleep.    An After Visit Summary with all of these plans were given to the patient.        Follow Up:  No follow-ups on file.

## 2023-01-17 LAB
25(OH)D3+25(OH)D2 SERPL-MCNC: 56.2 NG/ML (ref 30–100)
CHOLEST SERPL-MCNC: 247 MG/DL (ref 100–199)
HDLC SERPL-MCNC: 50 MG/DL
LDLC SERPL CALC-MCNC: 167 MG/DL (ref 0–99)
PSA SERPL-MCNC: 0.7 NG/ML (ref 0–4)
TRIGL SERPL-MCNC: 163 MG/DL (ref 0–149)
VLDLC SERPL CALC-MCNC: 30 MG/DL (ref 5–40)

## 2023-05-22 ENCOUNTER — OFFICE VISIT (OUTPATIENT)
Dept: SLEEP MEDICINE | Facility: HOSPITAL | Age: 63
End: 2023-05-22
Payer: MEDICAID

## 2023-05-22 VITALS
OXYGEN SATURATION: 94 % | HEIGHT: 70 IN | DIASTOLIC BLOOD PRESSURE: 72 MMHG | BODY MASS INDEX: 25.68 KG/M2 | WEIGHT: 179.4 LBS | SYSTOLIC BLOOD PRESSURE: 112 MMHG | HEART RATE: 98 BPM

## 2023-05-22 DIAGNOSIS — G47.9 SLEEP DISTURBANCES: ICD-10-CM

## 2023-05-22 DIAGNOSIS — F51.01 PRIMARY INSOMNIA: ICD-10-CM

## 2023-05-22 PROCEDURE — G0463 HOSPITAL OUTPT CLINIC VISIT: HCPCS

## 2023-05-22 NOTE — PROGRESS NOTES
CONSULT NOTE    Patient Identification:  Silas Kwon  62 y.o.  male  1960  [id number]            Requesting physician: Eduar Mccray    Reason for Consultation: Hypersomnia with associated insomnia    CC:     History of Present Illness:  Very pleasant 62-year-old gentleman reports multiple arousals at night and difficulty falling and maintaining sleep.  Also reports occasional snoring with no clear history of witnessed apnea.  Generally feels unrested in the morning and sleepy tired as the day progresses.  Occasional alcohol reported.  No parasomnia such as sleepwalking sleep talking or restless leg symptoms reported.  Goes to bed midnight gets of 8 gets about 6+ hours of sleep and feels tired.  Also reports allergies and headaches constantly.  Patient has been prescribed doxepin but uses as needed and uses seldomly.      Review of Systems  Positive for nasal congestion painful joints cough dizziness anxiety rest of the 12 point review of system negative [place] 7 out of 24 within normal limits  Past Medical History:  Past Medical History:   Diagnosis Date   • Allergic 2010   • Arthritis    • Deviated nasal septum    • Headache 2020   • HL (hearing loss) 2020   • Hyperlipidemia    • Knee pain, left        Past Surgical History:  Past Surgical History:   Procedure Laterality Date   • AMPUTATION FINGER / THUMB Left 08/2015    Medicare amputation of digits 4 and half of digit 5 following a power saw accident occurred   • KNEE ARTHROSCOPY Left 1/4/2017    Procedure: LT KNEE ARTHROSCOPY WITH PARTIAL MEDIAL AND LATERAL MENISECTOMY, CHONDROPLASTY;  Surgeon: Abiel Watson MD;  Location: [facility] [place];  Service:    • KNEE SURGERY     • LIMBAL STEM CELL TRANSPLANT      Right shoulder   • VASECTOMY     • WISDOM TOOTH EXTRACTION          Home Meds:  (Not in a hospital admission)      Allergies:  No Known Allergies    Social History:   Social History     Socioeconomic History   • Marital status:   "  Tobacco Use   • Smoking status: Never   • Smokeless tobacco: Never   Vaping Use   • Vaping Use: Never used   Substance and Sexual Activity   • Alcohol use: Yes     Comment: Occasional   • Drug use: No   • Sexual activity: Defer       Family History:  Family History   Problem Relation Age of Onset   • Colon cancer Brother    • Heart attack Mother 80   • Heart disease Mother        Physical Exam:  /72   Pulse 98   Ht 177.8 cm (70\")   Wt 81.4 kg (179 lb 6.4 oz)   SpO2 94%   BMI 25.74 kg/m²  Body mass index is 25.74 kg/m². 94% 81.4 kg (179 lb 6.4 oz)  Physical Exam  Patient is examined using the personal protective equipment as per guidelines from infection control for this particular patient as enacted.  Hand hygiene was performed before and after patient interaction.  Well-developed normal body habitus  Eyes normal conjunctivae and pupils reactive to light  ENT Mallampati between 2 and 3 normal nasal exam  Neck midline trachea no thyromegaly  Chest no labored breathing clear  CVS regular rate and rhythm no lower extremity edema  Abdomen soft nontender no hepatosplenomegaly  CNS intact normal sensory exam  Skin no rashes no nodules  Psych oriented to time place and person normal memory  Musculoskeletal no cyanosis no clubbing normal range of motion        LABS:  Lab Results   Component Value Date    CALCIUM 9.4 07/29/2019       No results found for: CKTOTAL, CKMB, CKMBINDEX, TROPONINI, TROPONINT                              No results found for: TSH  CrCl cannot be calculated (Patient's most recent lab result is older than the maximum 30 days allowed.).         Imaging: I personally visualized the images of scans/x-rays performed within last 3 days.      Assessment:  Hypersomnia  Insomnia unspecified  Allergic rhinitis          Recommendations:  At this time we have a gentleman with hypersomnia associated insomnia with somewhat obstructive airway anatomy  suggestive of sleep disordered " breathing.  Discussed pathophysiology consequence of untreated sleep apnea.  Patient agreeable wishing to proceed for home sleep study.  Sleep hygiene measures discussed in detail  Correlation between CESILIA and current symptoms was also discussed in detail  We will follow-up after sleep study to discuss further management options            Jie Moncada MD  5/22/2023  14:18 EDT      Much of this encounter note is an electronic transcription/translation of spoken language to printed text using Dragon Software.

## 2023-06-14 ENCOUNTER — HOSPITAL ENCOUNTER (OUTPATIENT)
Dept: SLEEP MEDICINE | Facility: HOSPITAL | Age: 63
End: 2023-06-14

## 2023-06-14 DIAGNOSIS — F51.01 PRIMARY INSOMNIA: ICD-10-CM

## 2023-06-14 DIAGNOSIS — G47.9 SLEEP DISTURBANCES: ICD-10-CM

## 2023-06-14 PROCEDURE — 95806 SLEEP STUDY UNATT&RESP EFFT: CPT

## 2024-01-29 ENCOUNTER — TELEPHONE (OUTPATIENT)
Dept: FAMILY MEDICINE CLINIC | Facility: CLINIC | Age: 64
End: 2024-01-29

## 2024-01-29 NOTE — TELEPHONE ENCOUNTER
Caller: Deborah Kwon    Relationship: Self    Best call back number: [phone]    What orders are you requesting (i.e. lab or imaging): XRAY OF RIGHT SHOULDER    DEBORAH IS GOING TO SEE SPECIALIST IN [facility] TO FOLLOW UP ON STEM CELL SURGERY AND THEY ARE REQUESTING A RECENT XRAY(PATIENT IS SELF PAY)    Where will you receive your lab/imaging services: [facility], [facility]

## 2024-01-29 NOTE — TELEPHONE ENCOUNTER
Patient wanted to know if you can call him regarding an x ray of his shoulder, he wanted to see if you can go ahead with an order for his xray his appointment its not until March.

## 2024-01-31 NOTE — TELEPHONE ENCOUNTER
I called and spoke with Silas and let him know that we could not order the x-ray for him since we have not seen him for this problem before. I did let him know that he could ask the specialist to send him in an order and he could then have it done at a radiology location that is non hospital associated as it may be cheaper. He verbalized understanding of this and asked me if [facility] was hospital associated. I let him know I did not believe it was but could not sa for certain. He verbalized understanding and said he would contact them and ask.     Also while on the phone he asked if he would be getting labs during his physical. I let him know that since it has been awhile he most likely will but can also not say for certain. He verbalized understanding of this as well.    He will call back if he needs anything else from us.

## 2025-03-19 ENCOUNTER — TELEPHONE (OUTPATIENT)
Dept: FAMILY MEDICINE CLINIC | Facility: CLINIC | Age: 65
End: 2025-03-19
Payer: MEDICARE

## 2025-03-19 NOTE — TELEPHONE ENCOUNTER
Caller: Silas Kwon    Relationship to patient: Self    Best call back number:     Patient is needing: PATIENT STATES HIS ALLERGIES ARE ACTING UP AND HE WOULD LIKE A CALLBACK FROM DR. GOMEZ TO DISCUSS IF HE SHOULD SEE AN ALLERGIST/ENT, OR REFILL HIS SINGULAIR.  PATIENT ALSO WANTS TO KNOW IF THERE MIGHT BE BETTER MEDICATION OPTIONS FOR HIM THAN SINGULAIR.

## 2025-03-26 ENCOUNTER — OFFICE VISIT (OUTPATIENT)
Dept: FAMILY MEDICINE CLINIC | Facility: CLINIC | Age: 65
End: 2025-03-26
Payer: MEDICARE

## 2025-03-26 VITALS
WEIGHT: 173.2 LBS | HEIGHT: 70 IN | TEMPERATURE: 98.3 F | OXYGEN SATURATION: 95 % | BODY MASS INDEX: 24.79 KG/M2 | HEART RATE: 92 BPM | SYSTOLIC BLOOD PRESSURE: 118 MMHG | DIASTOLIC BLOOD PRESSURE: 80 MMHG

## 2025-03-26 DIAGNOSIS — Z28.21 HERPES ZOSTER VACCINATION DECLINED: ICD-10-CM

## 2025-03-26 DIAGNOSIS — Z12.5 SPECIAL SCREENING, PROSTATE CANCER: ICD-10-CM

## 2025-03-26 DIAGNOSIS — J30.9 ALLERGIC RHINITIS, UNSPECIFIED SEASONALITY, UNSPECIFIED TRIGGER: ICD-10-CM

## 2025-03-26 DIAGNOSIS — Z28.21 COVID-19 VACCINATION DECLINED: ICD-10-CM

## 2025-03-26 DIAGNOSIS — Z28.21 INFLUENZA VACCINATION DECLINED: ICD-10-CM

## 2025-03-26 DIAGNOSIS — Z00.00 WELCOME TO MEDICARE PREVENTIVE VISIT: Primary | ICD-10-CM

## 2025-03-26 DIAGNOSIS — E78.2 MIXED HYPERLIPIDEMIA: ICD-10-CM

## 2025-03-26 DIAGNOSIS — Z28.21 PNEUMOCOCCAL VACCINATION DECLINED: ICD-10-CM

## 2025-03-26 DIAGNOSIS — E55.9 AVITAMINOSIS D: ICD-10-CM

## 2025-03-26 RX ORDER — MONTELUKAST SODIUM 10 MG/1
10 TABLET ORAL NIGHTLY
Qty: 30 TABLET | Refills: 11 | Status: SHIPPED | OUTPATIENT
Start: 2025-03-26

## 2025-03-26 RX ORDER — CHOLECALCIFEROL (VITAMIN D3) 25 MCG
1000 TABLET ORAL DAILY
COMMUNITY

## 2025-03-26 RX ORDER — AZELASTINE 1 MG/ML
2 SPRAY, METERED NASAL 2 TIMES DAILY
Qty: 30 ML | Refills: 12 | Status: SHIPPED | OUTPATIENT
Start: 2025-03-26

## 2025-03-26 RX ORDER — FLUTICASONE PROPIONATE 50 MCG
2 SPRAY, SUSPENSION (ML) NASAL DAILY
COMMUNITY

## 2025-03-26 NOTE — PROGRESS NOTES
Subjective   The ABCs of the Annual Wellness Visit  Medicare Wellness Visit      Silas Kwon is a 64 y.o. patient who presents for a Medicare Wellness Visit.    64-year-old male with history of hyperlipidemia, vitamin D deficiency, insomnia.    History of mixed hyperlipidemia.  Currently, has been feeling well without any myalgias, muscle aches, weakness, numbness, chest pain, short of breath or other issues.  Currently, is on no medications.    Last colonoscopy performed by Dr. Stephan Jones on 4/27/2021 finding a tubular adenoma only.  Repeat after 5 years recommended.    The following portions of the patient's history were reviewed and updated as appropriate: allergies, current medications, past family history, past medical history, past social history, past surgical history, and problem list.  Compared to one year ago, the patient's physical health is the same.  Compared to one year ago, the patient's mental health is the same.    Recent Hospitalizations:  He was not admitted to the hospital during the last year.     Current Medical Providers:  Patient Care Team:  Eduar Mccray MD as PCP - General (Internal Medicine)  Tanvir Dubois II, MD as Consulting Physician (Neurology)  Jie Moncada MD as Consulting Physician (Pulmonary Disease)    Outpatient Medications Prior to Visit   Medication Sig Dispense Refill    fluticasone (FLONASE) 50 MCG/ACT nasal spray Administer 2 sprays into the nostril(s) as directed by provider Daily.      Glucosamine-Chondroit-Vit C-Mn (GLUCOSAMINE CHONDR 500 COMPLEX) capsule Take 1 tablet by mouth Every Morning.      loratadine (CLARITIN) 10 MG tablet Take 1 tablet by mouth As Needed for Allergies.      MULTIPLE VITAMIN PO Take 1 tablet by mouth Daily.      NON FORMULARY Take 1 tablet by mouth Daily. Relief factor      Omega-3 Fatty Acids (FISH OIL) 1000 MG capsule capsule Take  by mouth Daily With Breakfast.      aspirin 81 MG EC tablet Take 81 mg by mouth Daily.  (Patient not taking: Reported on 3/26/2025)      Cholecalciferol 25 MCG (1000 UT) tablet Take 1 tablet by mouth Daily.      doxepin (SINEquan) 10 MG capsule Take 1 capsule by mouth At Night As Needed for Sleep. (Patient not taking: Reported on 3/26/2025) 30 capsule 1    ibuprofen (ADVIL,MOTRIN) 200 MG tablet Take 200 mg by mouth Every 6 (Six) Hours As Needed for mild pain (1-3). Stopped 12/21/16 (Patient not taking: Reported on 3/26/2025)      montelukast (Singulair) 10 MG tablet Take 1 tablet by mouth Every Night. (Patient not taking: Reported on 3/26/2025) 30 tablet 11     No facility-administered medications prior to visit.     No opioid medication identified on active medication list. I have reviewed chart for other potential  high risk medication/s and harmful drug interactions in the elderly.      Aspirin is on active medication list. Aspirin use is indicated based on review of current medical condition/s. Pros and cons of this therapy have been discussed today. Benefits of this medication outweigh potential harm.  Patient has been encouraged to continue taking this medication.  .      Patient Active Problem List   Diagnosis    HLD (hyperlipidemia)    Fungal infection of nail    Avitaminosis D    Abnormal bone development    Routine health maintenance    Chronic right shoulder pain    Allergic rhinitis    Episodic headache    Hearing loss, left    Vertigo    Dizziness on standing    Encounter for annual physical exam    Special screening, prostate cancer    Primary insomnia    Influenza vaccination declined    COVID-19 vaccination declined    Herpes zoster vaccination declined    Welcome to Medicare preventive visit    Pneumococcal vaccination declined     Advance Care Planning Advance Directive is not on file.  ACP discussion was declined by the patient. Patient has an advance directive (not in EMR), copy requested.      Objective   Vitals:    03/26/25 1421   BP: 118/80   Pulse: 92   Temp: 98.3 °F (36.8 °C)  "  SpO2: 95%   Weight: 78.6 kg (173 lb 3.2 oz)   Height: 177.8 cm (70\")   PainSc: 0-No pain       Estimated body mass index is 24.85 kg/m² as calculated from the following:    Height as of this encounter: 177.8 cm (70\").    Weight as of this encounter: 78.6 kg (173 lb 3.2 oz).    BMI is >= 25 and <30. (Overweight) The following options were offered after discussion;: exercise counseling/recommendations and nutrition counseling/recommendations           Gait and Balance Evaluation:  Normal    Does the patient have evidence of cognitive impairment? No, Mini-cog 4 out of 5 score                                                                                                Health  Risk Assessment    Smoking Status:  Social History     Tobacco Use   Smoking Status Never   Smokeless Tobacco Never     Alcohol Consumption:  Social History     Substance and Sexual Activity   Alcohol Use Yes    Comment: Occasional       Fall Risk Screen  STEADI Fall Risk Assessment was completed, and patient is at LOW risk for falls.Assessment completed on:3/26/2025    Depression Screening   Little interest or pleasure in doing things? Not at all   Feeling down, depressed, or hopeless? Not at all   PHQ-2 Total Score 0      Health Habits and Functional and Cognitive Screening:      3/23/2025     2:07 PM   Functional & Cognitive Status   Do you have difficulty preparing food and eating? No   Do you have difficulty bathing yourself, getting dressed or grooming yourself? No   Do you have difficulty using the toilet? No   Do you have difficulty moving around from place to place? No   Do you have trouble with steps or getting out of a bed or a chair? No   Current Diet Well Balanced Diet   Dental Exam Up to date   Eye Exam Up to date   Exercise (times per week) 1 times per week   Current Exercises Include No Regular Exercise;Hiking;Other;Walking   Do you need help using the phone?  No   Are you deaf or do you have serious difficulty hearing?  No   Do " you need help to go to places out of walking distance? No   Do you need help shopping? No   Do you need help preparing meals?  No   Do you need help with housework?  No   Do you need help with laundry? No   Do you need help taking your medications? No   Do you need help managing money? No   Do you ever drive or ride in a car without wearing a seat belt? No   Have you felt unusual stress, anger or loneliness in the last month? Yes   Who do you live with? Spouse   If you need help, do you have trouble finding someone available to you? No   Have you been bothered in the last four weeks by sexual problems? No   Do you have difficulty concentrating, remembering or making decisions? No           Visual Acuity:  No results found.  Age-appropriate Screening Schedule:  Refer to the list below for future screening recommendations based on patient's age, sex and/or medical conditions. Orders for these recommended tests are listed in the plan section. The patient has been provided with a written plan.    Health Maintenance List  Health Maintenance   Topic Date Due    LIPID PANEL  01/16/2024    INFLUENZA VACCINE  09/22/2025 (Originally 7/1/2024)    TDAP/TD VACCINES (2 - Td or Tdap) 09/22/2025 (Originally 1/17/2025)    ZOSTER VACCINE (1 of 2) 09/22/2025 (Originally 8/23/2009)    COVID-19 Vaccine (1 - 2024-25 season) 03/26/2026 (Originally 9/1/2024)    Pneumococcal Vaccine 50+ (1 of 1 - PCV) 03/26/2026 (Originally 8/23/2009)    ANNUAL WELLNESS VISIT  03/26/2026    COLORECTAL CANCER SCREENING  04/27/2026    HEPATITIS C SCREENING  Addressed      The 10-year ASCVD risk score (Alejandro RUBIO, et al., 2019) is: 12.9%    Values used to calculate the score:      Age: 64 years      Sex: Male      Is Non- : No      Diabetic: No      Tobacco smoker: No      Systolic Blood Pressure: 118 mmHg      Is BP treated: No      HDL Cholesterol: 50 mg/dL      Total Cholesterol: 247 mg/dL                                                                                                                                            CMS Preventative Services Quick Reference  Risk Factors Identified During Encounter  Immunizations Discussed/Encouraged: Tdap, Influenza, Prevnar 20 (Pneumococcal 20-valent conjugate), Shingrix, COVID19, and RSV (Respiratory Syncytial Virus)  Diagnoses and all orders for this visit:    1. Welcome to Medicare preventive visit (Primary)    2. Allergic rhinitis, unspecified seasonality, unspecified trigger  -     azelastine (ASTELIN) 0.1 % nasal spray; Administer 2 sprays into the nostril(s) as directed by provider 2 (Two) Times a Day. Use in each nostril as directed  Dispense: 30 mL; Refill: 12  -     montelukast (Singulair) 10 MG tablet; Take 1 tablet by mouth Every Night.  Dispense: 30 tablet; Refill: 11    3. Mixed hyperlipidemia  -     Comprehensive Metabolic Panel  -     Lipid Panel    4. Avitaminosis D  -     Vitamin D,25-Hydroxy    5. Special screening, prostate cancer  -     PSA Screen    6. COVID-19 vaccination declined    7. Herpes zoster vaccination declined    8. Influenza vaccination declined    9. Pneumococcal vaccination declined      Annual wellness visit reviewed with patient.  All past history, medications, social history, and problem list were reviewed.  Discussed advanced directives and living will.  Patient has living will: Living will: yes and patient will bring copy to office.  Discussed fall risk and precautions encourage removing throw rugs and using grab bars within the home and bathroom.  Will check the labs as ordered above to evaluate the blood sugars, kidney, liver, cholesterol for screening.  Discussed flu shot recommended to get the high-dose influenza vaccine annually in the fall.    Tdap, Prevnar 20, influenza, COVID series, hepatitis B, and Shingrix vaccination series discussed and recommended recommended.  Encourage follow-up with the eye doctor on annual basis for glaucoma evaluation.   Discussed weight and encouraged exercise as tolerated while following a healthy diet.  Colon cancer screening discussed and current status: colonoscopy completed 4/27/2021 with tubular adenoma and repeat colonoscopy after 5 years recommended.  Discussed prostate screening for which he is due for PSA screening.  Follow up with current specialists as needed.    The above risks/problems have been discussed with the patient.  Pertinent information has been shared with the patient in the After Visit Summary.  An After Visit Summary and PPPS were made available to the patient.    Follow Up:   Next Medicare Wellness visit to be scheduled in 1 year.

## 2025-03-27 LAB
25(OH)D3+25(OH)D2 SERPL-MCNC: 62 NG/ML (ref 30–100)
ALBUMIN SERPL-MCNC: 4.3 G/DL (ref 3.5–5.2)
ALBUMIN/GLOB SERPL: 2 G/DL
ALP SERPL-CCNC: 81 U/L (ref 39–117)
ALT SERPL-CCNC: 21 U/L (ref 1–41)
AST SERPL-CCNC: 28 U/L (ref 1–40)
BILIRUB SERPL-MCNC: 0.6 MG/DL (ref 0–1.2)
BUN SERPL-MCNC: 19 MG/DL (ref 8–23)
BUN/CREAT SERPL: 20.2 (ref 7–25)
CALCIUM SERPL-MCNC: 9.4 MG/DL (ref 8.6–10.5)
CHLORIDE SERPL-SCNC: 105 MMOL/L (ref 98–107)
CHOLEST SERPL-MCNC: 237 MG/DL (ref 0–200)
CO2 SERPL-SCNC: 24.1 MMOL/L (ref 22–29)
CREAT SERPL-MCNC: 0.94 MG/DL (ref 0.76–1.27)
EGFRCR SERPLBLD CKD-EPI 2021: 90 ML/MIN/1.73
GLOBULIN SER CALC-MCNC: 2.1 GM/DL
GLUCOSE SERPL-MCNC: 91 MG/DL (ref 65–99)
HDLC SERPL-MCNC: 46 MG/DL (ref 40–60)
LDLC SERPL CALC-MCNC: 172 MG/DL (ref 0–100)
POTASSIUM SERPL-SCNC: 4.3 MMOL/L (ref 3.5–5.2)
PROT SERPL-MCNC: 6.4 G/DL (ref 6–8.5)
PSA SERPL-MCNC: 0.72 NG/ML (ref 0–4)
SODIUM SERPL-SCNC: 141 MMOL/L (ref 136–145)
TRIGL SERPL-MCNC: 106 MG/DL (ref 0–150)
VLDLC SERPL CALC-MCNC: 19 MG/DL (ref 5–40)